# Patient Record
Sex: MALE | Race: WHITE | NOT HISPANIC OR LATINO | URBAN - NONMETROPOLITAN AREA
[De-identification: names, ages, dates, MRNs, and addresses within clinical notes are randomized per-mention and may not be internally consistent; named-entity substitution may affect disease eponyms.]

---

## 2017-01-06 ENCOUNTER — LAB (OUTPATIENT)
Dept: UROLOGY | Facility: CLINIC | Age: 42
End: 2017-01-06

## 2017-01-06 DIAGNOSIS — E29.1 HYPOGONADISM IN MALE: Primary | ICD-10-CM

## 2017-01-06 LAB
ALBUMIN SERPL-MCNC: 4.8 G/DL (ref 3.5–5)
ALP SERPL-CCNC: 83 U/L (ref 46–116)
ALT SERPL W P-5'-P-CCNC: 42 U/L (ref 10–44)
AST SERPL-CCNC: 27 U/L (ref 10–34)
BILIRUB CONJ SERPL-MCNC: 0.2 MG/DL (ref 0–0.2)
BILIRUB INDIRECT SERPL-MCNC: 0.6 MG/DL
BILIRUB SERPL-MCNC: 0.8 MG/DL (ref 0.2–1.8)
DEPRECATED RDW RBC AUTO: 39.4 FL (ref 37–54)
ERYTHROCYTE [DISTWIDTH] IN BLOOD BY AUTOMATED COUNT: 13.3 % (ref 11.5–14.5)
HCT VFR BLD AUTO: 53 % (ref 42–52)
HGB BLD-MCNC: 17.6 G/DL (ref 14–18)
MCH RBC QN AUTO: 26.9 PG (ref 27–33)
MCHC RBC AUTO-ENTMCNC: 33.2 G/DL (ref 33–37)
MCV RBC AUTO: 80.9 FL (ref 80–94)
PLATELET # BLD AUTO: 216 10*3/MM3 (ref 130–400)
PMV BLD AUTO: 11.8 FL (ref 6–10)
PROT SERPL-MCNC: 7.4 G/DL (ref 6–8)
RBC # BLD AUTO: 6.55 10*6/MM3 (ref 4.7–6.1)
TESTOST SERPL-MCNC: 222.88 NG/DL (ref 123.06–813.86)
WBC NRBC COR # BLD: 8.35 10*3/MM3 (ref 4.5–12.5)

## 2017-01-06 PROCEDURE — 80076 HEPATIC FUNCTION PANEL: CPT | Performed by: UROLOGY

## 2017-01-06 PROCEDURE — 84403 ASSAY OF TOTAL TESTOSTERONE: CPT | Performed by: UROLOGY

## 2017-01-06 PROCEDURE — 36415 COLL VENOUS BLD VENIPUNCTURE: CPT | Performed by: UROLOGY

## 2017-01-06 PROCEDURE — 85027 COMPLETE CBC AUTOMATED: CPT | Performed by: UROLOGY

## 2017-01-11 ENCOUNTER — OFFICE VISIT (OUTPATIENT)
Dept: UROLOGY | Facility: CLINIC | Age: 42
End: 2017-01-11

## 2017-01-11 DIAGNOSIS — E29.1 HYPOGONADISM IN MALE: Primary | ICD-10-CM

## 2017-01-11 PROCEDURE — 99213 OFFICE O/P EST LOW 20 MIN: CPT | Performed by: UROLOGY

## 2017-01-11 RX ORDER — PRAVASTATIN SODIUM 40 MG
TABLET ORAL
Refills: 5 | COMMUNITY
Start: 2016-12-28 | End: 2019-04-18 | Stop reason: SDUPTHER

## 2017-01-11 RX ORDER — AMLODIPINE BESYLATE 5 MG/1
TABLET ORAL
Refills: 5 | COMMUNITY
Start: 2016-12-28 | End: 2019-04-18

## 2017-01-11 RX ORDER — FLUTICASONE PROPIONATE 50 MCG
SPRAY, SUSPENSION (ML) NASAL
Refills: 5 | COMMUNITY
Start: 2016-12-28 | End: 2019-04-18 | Stop reason: SDUPTHER

## 2017-01-11 RX ORDER — MINOCYCLINE HYDROCHLORIDE 100 MG/1
CAPSULE ORAL
Refills: 2 | COMMUNITY
Start: 2016-12-09 | End: 2019-04-18

## 2017-01-11 RX ORDER — LISINOPRIL 40 MG/1
TABLET ORAL
Refills: 5 | COMMUNITY
Start: 2016-12-18 | End: 2019-04-18

## 2017-01-11 NOTE — MR AVS SNAPSHOT
[place]   2017 9:00 AM   Office Visit    Dept Phone:  [phone]   Encounter #:  [id number]    Provider:  Valdez Fisher MD   Department:  [facility] UROLOGY                Your Full Care Plan              Your Updated Medication List          This list is accurate as of: 17  9:36 AM.  Always use your most recent med list.                amLODIPine 5 MG tablet   Commonly known as:  NORVASC       fluticasone 50 MCG/ACT nasal spray   Commonly known as:  FLONASE       lisinopril 40 MG tablet   Commonly known as:  PRINIVIL,ZESTRIL       metoprolol tartrate 25 MG tablet   Commonly known as:  LOPRESSOR       minocycline 100 MG capsule   Commonly known as:  MINOCIN,DYNACIN       pravastatin 40 MG tablet   Commonly known as:  PRAVACHOL       Testosterone Cypionate 200 MG/ML injection   Commonly known as:  DEPO-TESTOSTERONE   Inject 1 mL into the shoulder, thigh, or buttocks 1 (one) time per week.               You Were Diagnosed With        Codes Comments    Hypogonadism in male    -  Primary ICD-10-CM: E29.1  ICD-9-CM: 257.2       Instructions     None    Patient Instructions History      Upcoming Appointments     Visit Type Date Time Department    FOLLOW UP 2017  9:00 AM [facility] UROLOGY [place]      [employer]ak Signup     [facility] Dayak allows you to send messages to your doctor, view your test results, renew your prescriptions, schedule appointments, and more. To sign up, go to [email or web address] and click on the Sign Up Now link in the New User? box. Enter your [employer] Activation Code exactly as it appears below along with the last four digits of your Social Security Number and your Date of Birth () to complete the sign-up process. If you do not sign up before the expiration date, you must request a new code.    Dayak Activation Code: [email or web address]  Expires: 2017 12:26 PM    If you have questions, you can email  [email or web address] or call [phone] to talk to our [employer] staff. Remember, [employer] is NOT to be used for urgent needs. For medical emergencies, dial 911.               Other Info from Your Visit           Allergies     No Known Allergies      Reason for Visit     Hypogonadism FU for Hypogonadism      Vital Signs     Smoking Status                   Former Smoker           Problems and Diagnoses Noted     Hypogonadism in male

## 2017-01-11 NOTE — PROGRESS NOTES
Chief Complaint:          Chief Complaint   Patient presents with   • Hypogonadism     FU for Hypogonadism       HPI:   41 y.o. male.  Pt is doing well on 1 cc testosterone per week.  His cbc and lft's are within normal limits.  HPI        Past Medical History:        Past Medical History   Diagnosis Date   • Fatigue    • Hypertension    • Hypogonadism in male          Current Meds:     Current Outpatient Prescriptions   Medication Sig Dispense Refill   • amLODIPine (NORVASC) 5 MG tablet TK 1 T PO QD  5   • fluticasone (FLONASE) 50 MCG/ACT nasal spray SHAKE LQ AND U 1 SPR IEN BID  5   • lisinopril (PRINIVIL,ZESTRIL) 40 MG tablet TAKE ONE TABLET BY MOUTH EVERY DAY  5   • metoprolol tartrate (LOPRESSOR) 25 MG tablet      • minocycline (MINOCIN,DYNACIN) 100 MG capsule TK ONE C PO D.  2   • pravastatin (PRAVACHOL) 40 MG tablet TK 1 T PO QD  5   • Testosterone Cypionate (DEPO-TESTOSTERONE) 200 MG/ML injection Inject 1 mL into the shoulder, thigh, or buttocks 1 (one) time per week. 4 mL 5     No current facility-administered medications for this visit.         Allergies:      No Known Allergies     Past Surgical History:     Past Surgical History   Procedure Laterality Date   • Tonsillectomy     • Mouth surgery           Social History:     Social History     Social History   • Marital status:      Spouse name: N/A   • Number of children: N/A   • Years of education: N/A     Occupational History   • Not on file.     Social History Main Topics   • Smoking status: Former Smoker   • Smokeless tobacco: Not on file   • Alcohol use No   • Drug use: Not on file   • Sexual activity: Not on file     Other Topics Concern   • Not on file     Social History Narrative   • No narrative on file       Family History:     Family History   Problem Relation Age of Onset   • Prostate cancer Other        Review of Systems:     Review of Systems    Physical Exam:     Physical Exam    Procedure:     No notes on file      Assessment:      Encounter Diagnosis   Name Primary?   • Hypogonadism in male Yes     No orders of the defined types were placed in this encounter.      Plan:   Will repeat cbc, lft's and total testosterone in 6 months.    Counseling was given to patient for the following topics diagnostic results. and the interim medical history and current results were reviewed.  A treatment plan with follow-up was made. Total time of the encounter was 21 minutes and 21 minutes were spent discussing Hypogonadism in male [E29.1] face-to-face.      This document has been electronically signed by Valdez Fisher MD January 11, 2017 9:26 AM

## 2017-07-02 DIAGNOSIS — R79.89 LOW TESTOSTERONE: ICD-10-CM

## 2017-07-03 RX ORDER — TESTOSTERONE CYPIONATE 200 MG/ML
INJECTION, SOLUTION INTRAMUSCULAR
Qty: 4 ML | Refills: 0 | Status: SHIPPED | OUTPATIENT
Start: 2017-07-03 | End: 2019-04-18

## 2017-07-03 NOTE — PROGRESS NOTES
Patient called stating he needs a refill on his testosterone cypionate.  Prescription for testosterone cypionate 200 mg to inject 1 mL per week #4 mL with no refills called to [facility] at [phone].

## 2017-07-25 ENCOUNTER — LAB (OUTPATIENT)
Dept: UROLOGY | Facility: CLINIC | Age: 42
End: 2017-07-25

## 2017-07-25 DIAGNOSIS — E29.1 HYPOGONADISM IN MALE: ICD-10-CM

## 2017-07-25 LAB
ALBUMIN SERPL-MCNC: 4.6 G/DL (ref 3.5–5)
ALP SERPL-CCNC: 74 U/L (ref 40–129)
ALT SERPL W P-5'-P-CCNC: 30 U/L (ref 10–44)
AST SERPL-CCNC: 27 U/L (ref 10–34)
BILIRUB CONJ SERPL-MCNC: 0.2 MG/DL (ref 0–0.2)
BILIRUB INDIRECT SERPL-MCNC: 0.7 MG/DL
BILIRUB SERPL-MCNC: 0.9 MG/DL (ref 0.2–1.8)
DEPRECATED RDW RBC AUTO: 43.5 FL (ref 37–54)
ERYTHROCYTE [DISTWIDTH] IN BLOOD BY AUTOMATED COUNT: 14.6 % (ref 11.5–14.5)
HCT VFR BLD AUTO: 53.6 % (ref 42–52)
HGB BLD-MCNC: 18.1 G/DL (ref 14–18)
MCH RBC QN AUTO: 27.3 PG (ref 27–33)
MCHC RBC AUTO-ENTMCNC: 33.8 G/DL (ref 33–37)
MCV RBC AUTO: 81 FL (ref 80–94)
PLATELET # BLD AUTO: 193 10*3/MM3 (ref 130–400)
PMV BLD AUTO: 11.6 FL (ref 6–10)
PROT SERPL-MCNC: 7.4 G/DL (ref 6–8)
RBC # BLD AUTO: 6.62 10*6/MM3 (ref 4.7–6.1)
TESTOST SERPL-MCNC: 742.04 NG/DL (ref 123.06–813.86)
WBC NRBC COR # BLD: 8.74 10*3/MM3 (ref 4.5–12.5)

## 2017-07-25 PROCEDURE — 85027 COMPLETE CBC AUTOMATED: CPT | Performed by: UROLOGY

## 2017-07-25 PROCEDURE — 84403 ASSAY OF TOTAL TESTOSTERONE: CPT | Performed by: UROLOGY

## 2017-07-25 PROCEDURE — 80076 HEPATIC FUNCTION PANEL: CPT | Performed by: UROLOGY

## 2017-08-03 ENCOUNTER — TRANSCRIBE ORDERS (OUTPATIENT)
Dept: INFUSION THERAPY | Facility: HOSPITAL | Age: 42
End: 2017-08-03

## 2017-08-03 ENCOUNTER — OFFICE VISIT (OUTPATIENT)
Dept: UROLOGY | Facility: CLINIC | Age: 42
End: 2017-08-03

## 2017-08-03 VITALS
WEIGHT: 235 LBS | BODY MASS INDEX: 36.88 KG/M2 | HEART RATE: 78 BPM | HEIGHT: 67 IN | SYSTOLIC BLOOD PRESSURE: 145 MMHG | DIASTOLIC BLOOD PRESSURE: 79 MMHG

## 2017-08-03 DIAGNOSIS — D75.1 POLYCYTHEMIA: Primary | ICD-10-CM

## 2017-08-03 DIAGNOSIS — E29.1 HYPOGONADISM, TESTICULAR: Primary | ICD-10-CM

## 2017-08-03 DIAGNOSIS — D75.1 POLYCYTHEMIA: ICD-10-CM

## 2017-08-03 PROCEDURE — 99213 OFFICE O/P EST LOW 20 MIN: CPT | Performed by: UROLOGY

## 2017-08-03 NOTE — PROGRESS NOTES
Chief Complaint:          Chief Complaint   Patient presents with   • Hypogonadism     6MTH FOLLOW UP       HPI:   42 y.o. male.    HPI  Pt is one cc of testosterone weekly.  Pt has normal lft's but his hgb is up to 18.      Past Medical History:        Past Medical History:   Diagnosis Date   • Fatigue    • Hypertension    • Hypogonadism in male          Current Meds:     Current Outpatient Prescriptions   Medication Sig Dispense Refill   • amLODIPine (NORVASC) 5 MG tablet TK 1 T PO QD  5   • fluticasone (FLONASE) 50 MCG/ACT nasal spray SHAKE LQ AND U 1 SPR IEN BID  5   • lisinopril (PRINIVIL,ZESTRIL) 40 MG tablet TAKE ONE TABLET BY MOUTH EVERY DAY  5   • metoprolol tartrate (LOPRESSOR) 25 MG tablet      • minocycline (MINOCIN,DYNACIN) 100 MG capsule TK ONE C PO D.  2   • pravastatin (PRAVACHOL) 40 MG tablet TK 1 T PO QD  5   • Testosterone Cypionate (DEPOTESTOTERONE CYPIONATE) 200 MG/ML injection INJECT 1 ML IN THE MUSCLE ONCE WEEKLY 4 mL 0     No current facility-administered medications for this visit.         Allergies:      No Known Allergies     Past Surgical History:     Past Surgical History:   Procedure Laterality Date   • MOUTH SURGERY     • TONSILLECTOMY           Social History:     Social History     Social History   • Marital status:      Spouse name: N/A   • Number of children: N/A   • Years of education: N/A     Occupational History   • Not on file.     Social History Main Topics   • Smoking status: Former Smoker   • Smokeless tobacco: Not on file   • Alcohol use No   • Drug use: Not on file   • Sexual activity: Not on file     Other Topics Concern   • Not on file     Social History Narrative       Family History:     Family History   Problem Relation Age of Onset   • Prostate cancer Other        Review of Systems:     Review of Systems    Physical Exam:     Physical Exam    Procedure:     No notes on file      Assessment:     Encounter Diagnoses   Name Primary?   • Polycythemia    •  Hypogonadism, testicular Yes     Orders Placed This Encounter   Procedures   • CBC (No Diff)     Standing Status:   Future     Standing Expiration Date:   8/3/2018       Plan:   Will have pt have one unit off to lower hgb.  Will repeat cbc and see pt back in 3 months.    Counseling was given to patient for the following topics diagnostic results. and the interim medical history and current results were reviewed.  A treatment plan with follow-up was made. Total time of the encounter was 23 minutes and 23 minutes were spent discussing Hypogonadism, testicular [E29.1] face-to-face.        This document has been electronically signed by Valdez Fisher MD August 3, 2017 2:04 PM

## 2017-08-08 ENCOUNTER — HOSPITAL ENCOUNTER (OUTPATIENT)
Dept: INFUSION THERAPY | Facility: HOSPITAL | Age: 42
Discharge: HOME | End: 2017-08-08
Attending: UROLOGY | Admitting: UROLOGY

## 2017-08-08 VITALS
HEART RATE: 73 BPM | TEMPERATURE: 98 F | RESPIRATION RATE: 17 BRPM | DIASTOLIC BLOOD PRESSURE: 74 MMHG | SYSTOLIC BLOOD PRESSURE: 121 MMHG

## 2017-08-08 DIAGNOSIS — D75.1 POLYCYTHEMIA: Primary | ICD-10-CM

## 2017-08-08 LAB
HCT VFR BLD AUTO: 53.5 % (ref 42–52)
HGB BLD-MCNC: 17.9 G/DL (ref 14–18)
POST-BLOOD PRESSURE: NORMAL
PRE-BLOOD PRESSURE: NORMAL
PRE-HCT: NORMAL
PRE-HGB: NORMAL
PULSE: 74
VOLUME COLLECTED: NORMAL

## 2017-08-08 PROCEDURE — 85014 HEMATOCRIT: CPT | Performed by: UROLOGY

## 2017-08-08 PROCEDURE — 99195 PHLEBOTOMY: CPT

## 2017-08-08 PROCEDURE — 85018 HEMOGLOBIN: CPT | Performed by: UROLOGY

## 2017-08-08 PROCEDURE — 99195 PHLEBOTOMY: CPT | Performed by: UROLOGY

## 2017-08-08 NOTE — PATIENT INSTRUCTIONS
Therapeutic Phlebotomy  Therapeutic phlebotomy is the controlled removal of blood from a person's body for the purpose of treating a medical condition. The procedure is similar to donating blood. Usually, about a pint (470 mL, or 0.47L) of blood is removed. The average adult has 9-12 pints (4.3-5.7 L) of blood.  Therapeutic phlebotomy may be used to treat the following medical conditions:  · Hemochromatosis. This is a condition in which the blood contains too much iron.  · Polycythemia vera. This is a condition in which the blood contains too many red blood cells.  · Porphyria cutanea tarda. This is a disease in which an important part of hemoglobin is not made properly. It results in the buildup of abnormal amounts of porphyrins in the body.  · Sickle cell disease. This is a condition in which the red blood cells form an abnormal crescent shape rather than a round shape.  LET YOUR HEALTH CARE PROVIDER KNOW ABOUT:  · Any allergies you have.  · All medicines you are taking, including vitamins, herbs, eye drops, creams, and over-the-counter medicines.  · Previous problems you or members of your family have had with the use of anesthetics.  · Any blood disorders you have.  · Previous surgeries you have had.  · Any medical conditions you may have.  RISKS AND COMPLICATIONS  Generally, this is a safe procedure. However, problems may occur, including:  · Nausea or light-headedness.  · Low blood pressure.  · Soreness, bleeding, swelling, or bruising at the needle insertion site.  · Infection.  BEFORE THE PROCEDURE  · Follow instructions from your health care provider about eating or drinking restrictions.  · Ask your health care provider about changing or stopping your regular medicines. This is especially important if you are taking diabetes medicines or blood thinners.  · Wear clothing with sleeves that can be raised above the elbow.  · Plan to have someone take you home after the procedure.  · You may have a blood sample  taken.  PROCEDURE  · A needle will be inserted into one of your veins.  · Tubing and a collection bag will be attached to that needle.  · Blood will flow through the needle and tubing into the collection bag.  · You may be asked to open and close your hand slowly and continually during the entire collection.  · After the specified amount of blood has been removed from your body, the collection bag and tubing will be clamped.  · The needle will be removed from your vein.  · Pressure will be held on the site of the needle insertion to stop the bleeding.  · A bandage (dressing) will be placed over the needle insertion site.  The procedure may vary among health care providers and hospitals.  AFTER THE PROCEDURE  · Your recovery will be assessed and monitored.  · You can return to your normal activities as directed by your health care provider.     This information is not intended to replace advice given to you by your health care provider. Make sure you discuss any questions you have with your health care provider.     Document Released: 05/21/2012 Document Revised: 05/03/2016 Document Reviewed: 12/14/2015  [employer] Interactive Patient Education ©2017 Elsevier Inc.  Therapeutic Phlebotomy, Care After  Refer to this sheet in the next few weeks. These instructions provide you with information about caring for yourself after your procedure. Your health care provider may also give you more specific instructions. Your treatment has been planned according to current medical practices, but problems sometimes occur. Call your health care provider if you have any problems or questions after your procedure.  WHAT TO EXPECT AFTER THE PROCEDURE  After your procedure, it is common to have:  · Light-headedness or dizziness. You may feel faint.  · Nausea.  · Tiredness.  HOME CARE INSTRUCTIONS  Activities  · Return to your normal activities as directed by your health care provider. Most people can go back to their normal activities  right away.  · Avoid strenuous physical activity and heavy lifting or pulling for about 5 hours after the procedure. Do not lift anything that is heavier than 10 lb (4.5 kg).  · Athletes should avoid strenuous exercise for at least 12 hours.  · Change positions slowly for the remainder of the day. This will help to prevent light-headedness or fainting.  · If you feel light-headed, lie down until the feeling goes away.  Eating and Drinking  · Be sure to eat well-balanced meals for the next 24 hours.  · Drink enough fluid to keep your urine clear or pale yellow.  · Avoid drinking alcohol on the day that you had the procedure.  Care of the Needle Insertion Site  · Keep your bandage dry. You can remove the bandage after about 5 hours or as directed by your health care provider.  · If you have bleeding from the needle insertion site, elevate your arm and press firmly on the site until the bleeding stops.  · If you have bruising at the site, apply ice to the area:    Put ice in a plastic bag.    Place a towel between your skin and the bag.    Leave the ice on for 20 minutes, 2-3 times a day for the first 24 hours.  · If the swelling does not go away after 24 hours, apply a warm, moist washcloth to the area for 20 minutes, 2-3 times a day.  General Instructions  · Avoid smoking for at least 30 minutes after the procedure.  · Keep all follow-up visits as directed by your health care provider. It is important to continue with further therapeutic phlebotomy treatments as directed.  SEEK MEDICAL CARE IF:  · You have redness, swelling, or pain at the needle insertion site.  · You have fluid, blood, or pus coming from the needle insertion site.  · You feel light-headed, dizzy, or nauseated, and the feeling does not go away.  · You notice new bruising at the needle insertion site.  · You feel weaker than normal.  · You have a fever or chills.  SEEK IMMEDIATE MEDICAL CARE IF:  · You have severe nausea or vomiting.  · You have  chest pain.  · You have trouble breathing.     This information is not intended to replace advice given to you by your health care provider. Make sure you discuss any questions you have with your health care provider.     Document Released: 05/21/2012 Document Revised: 05/03/2016 Document Reviewed: 12/14/2015  [employer] Interactive Patient Education ©2017 Elsevier Inc.    Polycythemia Vera  Polycythemia vera (PV), or myeloproliferative disease, is a form of blood cancer in which the bone marrow makes too many (overproduces) red blood cells. The bone marrow may also make too many clotting cells (platelets) and white blood cells. Bone marrow is the spongy center of bones where blood cells are produced. Sometimes, there may be an overproduction of blood cells in the liver and spleen, causing those organs to become enlarged. Additionally, people who have PV are at a higher risk for stroke or heart attack because their blood may clot more easily. PV is a long-term disease.  CAUSES  Almost all people who have PV have an abnormal gene (genetic mutation) that causes changes in the way that the bone marrow makes blood cells. This gene, which is called JAK2, is not passed along from parent to child (is not hereditary). It is not known what triggers the genetic mutation that causes the body to produce too many red blood cells.  RISK FACTORS  This condition is more likely to develop in:  · Males.  · People who are 60 years of age or older.  SYMPTOMS  You may not have any symptoms in the early stage of PV. When symptoms develop, they may include:  · Shortness of breath.  · Dizziness.  · Hot and flushed skin.  · Itchy skin.  · Sweats, especially night sweats.  · Headache.  · Tiredness.  · Ringing in the ears.  · Blurred vision or blind spots.  · Bone pain.  · Weight loss.  · Fever.  · Blood-tinged vomit or bowel movements.  DIAGNOSIS  This condition may be diagnosed during a routine physical exam if you have a blood test called a  complete blood count (CBC). Your health care provider also may suspect PV if you have symptoms. During the physical exam, your provider may find that you have an enlarged liver or spleen. You may also have tests to confirm the diagnosis. These may include:  · A procedure to remove a sample of bone marrow for testing (bone marrow biopsy).  · Blood tests to check for:    The JAK2 gene.    Low levels of a hormone that helps to regulate blood production (erythropoietin).  TREATMENT  There is no cure for PV, but treatment can help to control the disease. There are several types of treatment. No single treatment works for everyone. You will need to work with a blood cancer specialist (hematologist) to find the treatment that is best for you. Options include:  · Periodically having some blood removed with a needle (drawn) to lower the number of red blood cells (phlebotomy).  · Medicine. Your health care provider may recommend:    Low-dose aspirin to lower your risk for blood clots.    A medicine to reduce red blood cell production (hydroxyurea).    A medicine to lower the number of red blood cells (interferon).    A medicine that slows down the effects of JAK2 (ruxolitinib).  HOME CARE INSTRUCTIONS  · Take over-the-counter and prescription medicines only as told by your health care provider.  · Return to your normal activities as told by your health care provider. Ask your health care provider what activities are safe for you.  · Do not use tobacco products, including cigarettes, chewing tobacco, or e-cigarettes. If you need help quitting, ask your health care provider.  · Keep all follow-up visits as told by your health care provider. This is important.  SEEK MEDICAL CARE IF:  · You have side effects from your medicines.  · Your symptoms change or get worse at home.  · You have blood in your stool or you vomit blood.  SEEK IMMEDIATE MEDICAL CARE IF:  · You have sudden and severe pain in your abdomen.  · You have chest  pain or difficulty breathing.  · You have signs of stroke, such as:    Sudden numbness.    Weakness of your face or arm.    Confusion.    Difficulty speaking or understanding speech.  These symptoms may represent a serious problem that is an emergency. Do not wait to see if the symptoms will go away. Get medical help right away. Call your local emergency services (911 in the [place] Do not drive yourself to the hospital.     This information is not intended to replace advice given to you by your health care provider. Make sure you discuss any questions you have with your health care provider.     Document Released: 09/12/2002 Document Revised: 04/10/2017 Document Reviewed: 06/29/2016  Elsevier Interactive Patient Education ©2017 Elsevier Inc.

## 2017-10-30 ENCOUNTER — LAB (OUTPATIENT)
Dept: UROLOGY | Facility: CLINIC | Age: 42
End: 2017-10-30

## 2017-10-30 DIAGNOSIS — E29.1 HYPOGONADISM, TESTICULAR: ICD-10-CM

## 2017-10-30 DIAGNOSIS — D75.1 POLYCYTHEMIA: ICD-10-CM

## 2017-10-30 LAB
DEPRECATED RDW RBC AUTO: 40.9 FL (ref 37–54)
ERYTHROCYTE [DISTWIDTH] IN BLOOD BY AUTOMATED COUNT: 13.4 % (ref 11.5–14.5)
HCT VFR BLD AUTO: 54.8 % (ref 42–52)
HGB BLD-MCNC: 17.9 G/DL (ref 14–18)
MCH RBC QN AUTO: 27.1 PG (ref 27–33)
MCHC RBC AUTO-ENTMCNC: 32.7 G/DL (ref 33–37)
MCV RBC AUTO: 82.9 FL (ref 80–94)
PLATELET # BLD AUTO: 209 10*3/MM3 (ref 130–400)
PMV BLD AUTO: 11.4 FL (ref 6–10)
RBC # BLD AUTO: 6.61 10*6/MM3 (ref 4.7–6.1)
WBC NRBC COR # BLD: 8.35 10*3/MM3 (ref 4.5–12.5)

## 2017-10-30 PROCEDURE — 85027 COMPLETE CBC AUTOMATED: CPT | Performed by: UROLOGY

## 2017-10-30 PROCEDURE — 36415 COLL VENOUS BLD VENIPUNCTURE: CPT | Performed by: UROLOGY

## 2017-11-03 ENCOUNTER — OFFICE VISIT (OUTPATIENT)
Dept: UROLOGY | Facility: CLINIC | Age: 42
End: 2017-11-03

## 2017-11-03 DIAGNOSIS — D75.1 POLYCYTHEMIA: Primary | ICD-10-CM

## 2017-11-03 DIAGNOSIS — E29.1 HYPOGONADISM, TESTICULAR: ICD-10-CM

## 2017-11-03 PROCEDURE — 99213 OFFICE O/P EST LOW 20 MIN: CPT | Performed by: UROLOGY

## 2017-11-03 NOTE — PROGRESS NOTES
Chief Complaint:          Chief Complaint   Patient presents with   • Hypogonadism       HPI:   42 y.o. male.    HPI  Pt is on 1 cc of testosterone weekly His hgb is almost 18 it is 17.9      Past Medical History:        Past Medical History:   Diagnosis Date   • Fatigue    • Hyperlipidemia    • Hypertension    • Hypogonadism in male          Current Meds:     Current Outpatient Prescriptions   Medication Sig Dispense Refill   • amLODIPine (NORVASC) 5 MG tablet TK 1 T PO QD  5   • fluticasone (FLONASE) 50 MCG/ACT nasal spray SHAKE LQ AND U 1 SPR IEN BID  5   • lisinopril (PRINIVIL,ZESTRIL) 40 MG tablet TAKE ONE TABLET BY MOUTH EVERY DAY  5   • metoprolol tartrate (LOPRESSOR) 25 MG tablet Take 25 mg by mouth Daily.     • minocycline (MINOCIN,DYNACIN) 100 MG capsule TK ONE C PO D.  2   • pravastatin (PRAVACHOL) 40 MG tablet TK 1 T PO QD  5   • Testosterone Cypionate (DEPOTESTOTERONE CYPIONATE) 200 MG/ML injection INJECT 1 ML IN THE MUSCLE ONCE WEEKLY 4 mL 0     No current facility-administered medications for this visit.         Allergies:      Allergies   Allergen Reactions   • Fish-Derived Products      Scallops and mackeral   • Other      turnips        Past Surgical History:     Past Surgical History:   Procedure Laterality Date   • MOUTH SURGERY     • TONSILLECTOMY           Social History:     Social History     Social History   • Marital status:      Spouse name: N/A   • Number of children: N/A   • Years of education: N/A     Occupational History   • Not on file.     Social History Main Topics   • Smoking status: Former Smoker     Quit date: 4/29/2013   • Smokeless tobacco: Not on file   • Alcohol use No   • Drug use: No   • Sexual activity: Defer     Other Topics Concern   • Not on file     Social History Narrative       Family History:     Family History   Problem Relation Age of Onset   • Prostate cancer Other    • Diabetes Mother    • Thyroid disease Father    • Hyperlipidemia Father    • Diabetes  Maternal Grandmother    • Anemia Paternal Grandmother        Review of Systems:     Review of Systems    Physical Exam:     Physical Exam    Procedure:     No notes on file      Assessment:     Encounter Diagnoses   Name Primary?   • Polycythemia Yes   • Hypogonadism, testicular      No orders of the defined types were placed in this encounter.      Plan:   Will have another unit of blood removed.  Will repeat cbc and testosterone and lft's in 3 month    Counseling was given to patient for the following topics instructions for management. and the interim medical history and current results were reviewed.  A treatment plan with follow-up was made. Total time of the encounter was 23 minutes and 23 minutes were spent discussing Polycythemia [D75.1] face-to-face.        This document has been electronically signed by Valdez Fisher MD November 3, 2017 1:35 PM

## 2017-11-15 ENCOUNTER — DOCUMENTATION (OUTPATIENT)
Dept: UROLOGY | Facility: CLINIC | Age: 42
End: 2017-11-15

## 2017-11-15 ENCOUNTER — TRANSCRIBE ORDERS (OUTPATIENT)
Dept: INFUSION THERAPY | Facility: HOSPITAL | Age: 42
End: 2017-11-15

## 2017-11-15 DIAGNOSIS — D75.1 POLYCYTHEMIA: Primary | ICD-10-CM

## 2017-11-15 NOTE — PROGRESS NOTES
Patient scheduled with [facility] for Therapeutic Phlebotomy on 11/21/17 at 9:00am, Patient aware of appointment date and time and voices understanding at this time.

## 2017-11-17 ENCOUNTER — APPOINTMENT (OUTPATIENT)
Dept: CT IMAGING | Facility: HOSPITAL | Age: 42
End: 2017-11-17

## 2017-11-17 ENCOUNTER — APPOINTMENT (OUTPATIENT)
Dept: GENERAL RADIOLOGY | Facility: HOSPITAL | Age: 42
End: 2017-11-17

## 2017-11-17 ENCOUNTER — APPOINTMENT (OUTPATIENT)
Dept: NUCLEAR MEDICINE | Facility: HOSPITAL | Age: 42
End: 2017-11-17

## 2017-11-17 ENCOUNTER — HOSPITAL ENCOUNTER (EMERGENCY)
Facility: HOSPITAL | Age: 42
Discharge: HOME | End: 2017-11-17
Attending: FAMILY MEDICINE | Admitting: FAMILY MEDICINE

## 2017-11-17 VITALS
RESPIRATION RATE: 18 BRPM | DIASTOLIC BLOOD PRESSURE: 78 MMHG | WEIGHT: 215 LBS | SYSTOLIC BLOOD PRESSURE: 120 MMHG | OXYGEN SATURATION: 97 % | HEIGHT: 67 IN | BODY MASS INDEX: 33.74 KG/M2 | TEMPERATURE: 98.2 F | HEART RATE: 95 BPM

## 2017-11-17 DIAGNOSIS — R55 SYNCOPE, UNSPECIFIED SYNCOPE TYPE: Primary | ICD-10-CM

## 2017-11-17 DIAGNOSIS — R03.0 ELEVATED BLOOD PRESSURE READING: ICD-10-CM

## 2017-11-17 DIAGNOSIS — R56.9 SEIZURE (HCC): ICD-10-CM

## 2017-11-17 LAB
ALBUMIN SERPL-MCNC: 5.3 G/DL (ref 3.5–5)
ALBUMIN/GLOB SERPL: 2 G/DL (ref 1.5–2.5)
ALP SERPL-CCNC: 68 U/L (ref 40–129)
ALT SERPL W P-5'-P-CCNC: 41 U/L (ref 10–44)
ANION GAP SERPL CALCULATED.3IONS-SCNC: 2.5 MMOL/L (ref 3.6–11.2)
AST SERPL-CCNC: 38 U/L (ref 10–34)
BASOPHILS # BLD AUTO: 0.03 10*3/MM3 (ref 0–0.3)
BASOPHILS NFR BLD AUTO: 0.2 % (ref 0–2)
BILIRUB SERPL-MCNC: 0.8 MG/DL (ref 0.2–1.8)
BNP SERPL-MCNC: 6 PG/ML (ref 0–100)
BUN BLD-MCNC: 18 MG/DL (ref 7–21)
BUN/CREAT SERPL: 14.3 (ref 7–25)
CALCIUM SPEC-SCNC: 9.9 MG/DL (ref 7.7–10)
CHLORIDE SERPL-SCNC: 106 MMOL/L (ref 99–112)
CO2 SERPL-SCNC: 30.5 MMOL/L (ref 24.3–31.9)
CREAT BLD-MCNC: 1.26 MG/DL (ref 0.43–1.29)
DEPRECATED RDW RBC AUTO: 41.8 FL (ref 37–54)
EOSINOPHIL # BLD AUTO: 0.09 10*3/MM3 (ref 0–0.7)
EOSINOPHIL NFR BLD AUTO: 0.6 % (ref 0–5)
ERYTHROCYTE [DISTWIDTH] IN BLOOD BY AUTOMATED COUNT: 14.2 % (ref 11.5–14.5)
GFR SERPL CREATININE-BSD FRML MDRD: 63 ML/MIN/1.73
GLOBULIN UR ELPH-MCNC: 2.6 GM/DL
GLUCOSE BLD-MCNC: 91 MG/DL (ref 70–110)
HCT VFR BLD AUTO: 55.1 % (ref 42–52)
HGB BLD-MCNC: 18.7 G/DL (ref 14–18)
HOLD SPECIMEN: NORMAL
HOLD SPECIMEN: NORMAL
IMM GRANULOCYTES # BLD: 0.03 10*3/MM3 (ref 0–0.03)
IMM GRANULOCYTES NFR BLD: 0.2 % (ref 0–0.5)
INR PPP: 0.99 (ref 0.9–1.1)
LYMPHOCYTES # BLD AUTO: 1.55 10*3/MM3 (ref 1–3)
LYMPHOCYTES NFR BLD AUTO: 10.6 % (ref 21–51)
MCH RBC QN AUTO: 27.9 PG (ref 27–33)
MCHC RBC AUTO-ENTMCNC: 33.9 G/DL (ref 33–37)
MCV RBC AUTO: 82.1 FL (ref 80–94)
MONOCYTES # BLD AUTO: 0.99 10*3/MM3 (ref 0.1–0.9)
MONOCYTES NFR BLD AUTO: 6.7 % (ref 0–10)
NEUTROPHILS # BLD AUTO: 11.99 10*3/MM3 (ref 1.4–6.5)
NEUTROPHILS NFR BLD AUTO: 81.7 % (ref 30–70)
OSMOLALITY SERPL CALC.SUM OF ELEC: 279 MOSM/KG (ref 273–305)
PLATELET # BLD AUTO: 224 10*3/MM3 (ref 130–400)
PMV BLD AUTO: 10.9 FL (ref 6–10)
POTASSIUM BLD-SCNC: 5.2 MMOL/L (ref 3.5–5.3)
PROT SERPL-MCNC: 7.9 G/DL (ref 6–8)
PROTHROMBIN TIME: 13.2 SECONDS (ref 11–15.4)
RBC # BLD AUTO: 6.71 10*6/MM3 (ref 4.7–6.1)
SODIUM BLD-SCNC: 139 MMOL/L (ref 135–153)
TROPONIN I SERPL-MCNC: <0.006 NG/ML
TROPONIN I SERPL-MCNC: <0.006 NG/ML
WBC NRBC COR # BLD: 14.68 10*3/MM3 (ref 4.5–12.5)
WHOLE BLOOD HOLD SPECIMEN: NORMAL
WHOLE BLOOD HOLD SPECIMEN: NORMAL

## 2017-11-17 PROCEDURE — 84484 ASSAY OF TROPONIN QUANT: CPT | Performed by: FAMILY MEDICINE

## 2017-11-17 PROCEDURE — 70491 CT SOFT TISSUE NECK W/DYE: CPT

## 2017-11-17 PROCEDURE — 70470 CT HEAD/BRAIN W/O & W/DYE: CPT

## 2017-11-17 PROCEDURE — 85610 PROTHROMBIN TIME: CPT | Performed by: FAMILY MEDICINE

## 2017-11-17 PROCEDURE — 71010 XR CHEST 1 VW: CPT | Performed by: RADIOLOGY

## 2017-11-17 PROCEDURE — 84146 ASSAY OF PROLACTIN: CPT | Performed by: FAMILY MEDICINE

## 2017-11-17 PROCEDURE — 83880 ASSAY OF NATRIURETIC PEPTIDE: CPT | Performed by: FAMILY MEDICINE

## 2017-11-17 PROCEDURE — 93005 ELECTROCARDIOGRAM TRACING: CPT | Performed by: FAMILY MEDICINE

## 2017-11-17 PROCEDURE — 71275 CT ANGIOGRAPHY CHEST: CPT

## 2017-11-17 PROCEDURE — 71275 CT ANGIOGRAPHY CHEST: CPT | Performed by: RADIOLOGY

## 2017-11-17 PROCEDURE — 70460 CT HEAD/BRAIN W/DYE: CPT | Performed by: RADIOLOGY

## 2017-11-17 PROCEDURE — 71010 HC CHEST PA OR AP: CPT

## 2017-11-17 PROCEDURE — 80053 COMPREHEN METABOLIC PANEL: CPT | Performed by: FAMILY MEDICINE

## 2017-11-17 PROCEDURE — 0 IOPAMIDOL 61 % SOLUTION: Performed by: FAMILY MEDICINE

## 2017-11-17 PROCEDURE — 70491 CT SOFT TISSUE NECK W/DYE: CPT | Performed by: RADIOLOGY

## 2017-11-17 PROCEDURE — 85025 COMPLETE CBC W/AUTO DIFF WBC: CPT | Performed by: FAMILY MEDICINE

## 2017-11-17 PROCEDURE — 70450 CT HEAD/BRAIN W/O DYE: CPT

## 2017-11-17 PROCEDURE — 70460 CT HEAD/BRAIN W/DYE: CPT

## 2017-11-17 PROCEDURE — 99284 EMERGENCY DEPT VISIT MOD MDM: CPT

## 2017-11-17 PROCEDURE — 93010 ELECTROCARDIOGRAM REPORT: CPT | Performed by: INTERNAL MEDICINE

## 2017-11-17 PROCEDURE — 36415 COLL VENOUS BLD VENIPUNCTURE: CPT

## 2017-11-17 PROCEDURE — 70450 CT HEAD/BRAIN W/O DYE: CPT | Performed by: RADIOLOGY

## 2017-11-17 RX ORDER — ACETAMINOPHEN 325 MG/1
650 TABLET ORAL ONCE
Status: COMPLETED | OUTPATIENT
Start: 2017-11-17 | End: 2017-11-17

## 2017-11-17 RX ORDER — SODIUM CHLORIDE 0.9 % (FLUSH) 0.9 %
10 SYRINGE (ML) INJECTION AS NEEDED
Status: DISCONTINUED | OUTPATIENT
Start: 2017-11-17 | End: 2017-11-17 | Stop reason: HOSPADM

## 2017-11-17 RX ADMIN — IOPAMIDOL 100 ML: 612 INJECTION, SOLUTION INTRAVENOUS at 19:26

## 2017-11-17 RX ADMIN — ACETAMINOPHEN 650 MG: 325 TABLET ORAL at 18:33

## 2017-11-17 NOTE — ED PROVIDER NOTES
"Subjective   HPI Comments: 42-year-old male with history of hypertension, hyperlipidemia, polycythemia presents the emergency room with syncopal episode that occurred prior to arrival.  Patient states he was at work at the local [employer].  He states he was standing and had just completed a transaction with a customer when he subsequently went to go to the lunchroom.  He states that he started to feel dizzy and lightheaded and subsequently passed out.  Patient states that he woke up approximately couple minutes later.  He states that bystanders/coworkers told him that he was driving open eyes rolling in the back of his head during episode.  He denies nausea, vomiting, chest pain or shortness of breath.  He denies palpitations.  He denies to have any symptoms prior to passing out.  Patient states he does have a mild headache and feels \"drained\".    Patient is a 42 y.o. male presenting with syncope.   Syncope   Episode history:  Single  Most recent episode:  Today  Progression:  Resolved  Chronicity:  New  Context: standing up    Witnessed: yes    Relieved by:  Nothing  Worsened by:  Nothing  Associated symptoms: dizziness and seizures    Associated symptoms: no chest pain, no fever, no focal weakness, no malaise/fatigue, no nausea, no palpitations, no shortness of breath, no visual change, no vomiting and no weakness        Review of Systems   Constitutional: Negative for activity change, fever and malaise/fatigue.   HENT: Negative for congestion, sore throat and tinnitus.    Eyes: Negative for visual disturbance.   Respiratory: Negative for apnea, cough, shortness of breath, wheezing and stridor.    Cardiovascular: Positive for syncope. Negative for chest pain and palpitations.   Gastrointestinal: Negative for abdominal pain, diarrhea, nausea and vomiting.   Genitourinary: Negative for dysuria and flank pain.   Musculoskeletal: Negative for arthralgias and myalgias.   Skin: Negative for rash.   Neurological: " Positive for dizziness and seizures. Negative for focal weakness, weakness and light-headedness.   Hematological: Negative for adenopathy.   Psychiatric/Behavioral: Negative for agitation.   All other systems reviewed and are negative.      Past Medical History:   Diagnosis Date   • Fatigue    • Hyperlipidemia    • Hypertension    • Hypogonadism in male        Allergies   Allergen Reactions   • Fish-Derived Products      Scallops and mackeral   • Other      turnips       Past Surgical History:   Procedure Laterality Date   • MOUTH SURGERY     • TONSILLECTOMY         Family History   Problem Relation Age of Onset   • Prostate cancer Other    • Diabetes Mother    • Thyroid disease Father    • Hyperlipidemia Father    • Diabetes Maternal Grandmother    • Anemia Paternal Grandmother        Social History     Social History   • Marital status:      Spouse name: N/A   • Number of children: N/A   • Years of education: N/A     Social History Main Topics   • Smoking status: Former Smoker     Quit date: 4/29/2013   • Smokeless tobacco: None   • Alcohol use No   • Drug use: No   • Sexual activity: Defer     Other Topics Concern   • None     Social History Narrative           Objective   Physical Exam   Constitutional: He is oriented to person, place, and time. He appears well-developed and well-nourished. No distress.   HENT:   Head: Normocephalic and atraumatic.   Mouth/Throat: Oropharynx is clear and moist.   Eyes: EOM are normal. Pupils are equal, round, and reactive to light.   Anicteric.  No nystagmus.  No gaze palsy   Neck: Normal range of motion. Neck supple.   No JVD.  No carotid bruit.  No lymphadenopathy.  No thyromegaly.   Cardiovascular: Normal rate, regular rhythm and normal heart sounds.   No extrasystoles are present.   Pulses:       Radial pulses are 2+ on the right side, and 2+ on the left side.        Dorsalis pedis pulses are 2+ on the right side, and 2+ on the left side.   Pulmonary/Chest: Effort  normal and breath sounds normal. No respiratory distress. He has no wheezes. He has no rales. He exhibits no tenderness.   No accessory muscle use.   Abdominal: Soft. Bowel sounds are normal. There is no rebound and no guarding.   No abdominal bruit.  No pulsatile mass.  Mild epigastric tenderness to palpation.   Neurological: He is alert and oriented to person, place, and time. No cranial nerve deficit or sensory deficit. GCS eye subscore is 4. GCS verbal subscore is 5. GCS motor subscore is 6.   No pronator drift.  No lower limb drift.  Normal finger-nose test.  Normal heel-to-shin test.   Skin: Skin is warm and dry. He is not diaphoretic.   Nursing note and vitals reviewed.      Procedures         ED Course  ED Course                Patient neurologically intact.  Patient and extra scale is 0.  Patient with no seizure activity throughout emergency room stay.  Patient first set of cardiac enzymes unremarkable.  Patient's CT scan of head is unremarkable.  Patient with elevated white count likely secondary to possibly recent seizure-like activity.  I discussed findings with patient.  Will obtain further imaging in light of patient's recent syncopal episode we'll continue to monitor patient.    Patient continues to remain hemodynamically stable.  Patient with essentially unremarkable except for elevated heart rate with changes in position.  Patient's CT chest shows no pulmonary embolism.  Patient's CT scan of head as well as neck shows no acute abnormality.  Patient did report symptoms of passing out subscore had seizure-like activity with drawing up as well as eyes rolling.  In light of the symptoms patient likely with new onset seizure.  Have discussed the follow-up with neurology.  Have discussed no driving and operating heavy machinery until follow-up with neurology.  Have discussed to contact neurology on Monday.  Patient discussed warning symptoms or return emergency room.  Patient with 2 sets of cardiac  enzymes returned negative patient verbalizes understanding.  [facility]    Final diagnoses:   Syncope, unspecified syncope type   Seizure   Elevated blood pressure reading            Gerson Wilson MD  11/17/17 [id number]

## 2017-11-19 LAB — PROLACTIN SERPL-MCNC: 12.2 NG/ML (ref 4–15.2)

## 2017-11-21 ENCOUNTER — HOSPITAL ENCOUNTER (OUTPATIENT)
Dept: INFUSION THERAPY | Facility: HOSPITAL | Age: 42
Discharge: HOME | End: 2017-11-21
Attending: UROLOGY | Admitting: UROLOGY

## 2017-11-21 VITALS
DIASTOLIC BLOOD PRESSURE: 66 MMHG | RESPIRATION RATE: 17 BRPM | WEIGHT: 218 LBS | BODY MASS INDEX: 34.21 KG/M2 | SYSTOLIC BLOOD PRESSURE: 109 MMHG | TEMPERATURE: 98.2 F | HEART RATE: 84 BPM | HEIGHT: 67 IN

## 2017-11-21 DIAGNOSIS — D75.1 POLYCYTHEMIA: Primary | ICD-10-CM

## 2017-11-21 LAB
BASOPHILS # BLD AUTO: 0.04 10*3/MM3 (ref 0–0.3)
BASOPHILS NFR BLD AUTO: 0.5 % (ref 0–2)
DEPRECATED RDW RBC AUTO: 40.9 FL (ref 37–54)
EOSINOPHIL # BLD AUTO: 0.17 10*3/MM3 (ref 0–0.7)
EOSINOPHIL NFR BLD AUTO: 2.1 % (ref 0–5)
ERYTHROCYTE [DISTWIDTH] IN BLOOD BY AUTOMATED COUNT: 13.4 % (ref 11.5–14.5)
HCT VFR BLD AUTO: 54.3 % (ref 42–52)
HGB BLD-MCNC: 18.2 G/DL (ref 14–18)
IMM GRANULOCYTES # BLD: 0.02 10*3/MM3 (ref 0–0.03)
IMM GRANULOCYTES NFR BLD: 0.2 % (ref 0–0.5)
LYMPHOCYTES # BLD AUTO: 1.82 10*3/MM3 (ref 1–3)
LYMPHOCYTES NFR BLD AUTO: 22.3 % (ref 21–51)
MCH RBC QN AUTO: 27.6 PG (ref 27–33)
MCHC RBC AUTO-ENTMCNC: 33.5 G/DL (ref 33–37)
MCV RBC AUTO: 82.4 FL (ref 80–94)
MONOCYTES # BLD AUTO: 0.75 10*3/MM3 (ref 0.1–0.9)
MONOCYTES NFR BLD AUTO: 9.2 % (ref 0–10)
NEUTROPHILS # BLD AUTO: 5.37 10*3/MM3 (ref 1.4–6.5)
NEUTROPHILS NFR BLD AUTO: 65.7 % (ref 30–70)
PLATELET # BLD AUTO: 190 10*3/MM3 (ref 130–400)
PMV BLD AUTO: 10.4 FL (ref 6–10)
POST-BLOOD PRESSURE: NORMAL
PRE-BLOOD PRESSURE: NORMAL
PRE-HCT: 54.3
PRE-HGB: 18.2
PULSE: 74
RBC # BLD AUTO: 6.59 10*6/MM3 (ref 4.7–6.1)
VOLUME COLLECTED: 510
WBC NRBC COR # BLD: 8.17 10*3/MM3 (ref 4.5–12.5)

## 2017-11-21 PROCEDURE — 85025 COMPLETE CBC W/AUTO DIFF WBC: CPT | Performed by: UROLOGY

## 2017-11-21 PROCEDURE — 36415 COLL VENOUS BLD VENIPUNCTURE: CPT

## 2017-11-21 PROCEDURE — 99195 PHLEBOTOMY: CPT

## 2017-11-21 PROCEDURE — 99195 PHLEBOTOMY: CPT | Performed by: UROLOGY

## 2017-11-29 ENCOUNTER — OFFICE VISIT (OUTPATIENT)
Dept: UROLOGY | Facility: CLINIC | Age: 42
End: 2017-11-29

## 2017-11-29 VITALS — BODY MASS INDEX: 34.21 KG/M2 | HEIGHT: 67 IN | WEIGHT: 218 LBS

## 2017-11-29 DIAGNOSIS — N50.9 SCROTAL LESION: Primary | ICD-10-CM

## 2017-11-29 PROCEDURE — 99213 OFFICE O/P EST LOW 20 MIN: CPT | Performed by: UROLOGY

## 2017-11-29 NOTE — PROGRESS NOTES
"Chief Complaint:          Chief Complaint   Patient presents with   • Testicle Pain     Pt had a seizure almost 2 weeks ago and the next day noticed pain in the left testicle as well as a small \"spot\"       HPI:   42 y.o. male.  Pt had a seizure last week.  He went to ER and had a work up for this which pt reports as negative.   He had a hgb of 18.2 and he had a therapeutic phlebotomy last week for this.  He is on testosterone 1 cc every week.  Pt noticed a lesion on his left scrotum and that is why he is here today.  HPI        Past Medical History:        Past Medical History:   Diagnosis Date   • Fatigue    • Hyperlipidemia    • Hypertension    • Hypogonadism in male    • Seizures          Current Meds:     Current Outpatient Prescriptions   Medication Sig Dispense Refill   • amLODIPine (NORVASC) 5 MG tablet TK 1 T PO QD  5   • fluticasone (FLONASE) 50 MCG/ACT nasal spray SHAKE LQ AND U 1 SPR IEN BID  5   • lisinopril (PRINIVIL,ZESTRIL) 40 MG tablet TAKE ONE TABLET BY MOUTH EVERY DAY  5   • metoprolol tartrate (LOPRESSOR) 25 MG tablet Take 25 mg by mouth Daily.     • minocycline (MINOCIN,DYNACIN) 100 MG capsule TK ONE C PO D.  2   • pravastatin (PRAVACHOL) 40 MG tablet TK 1 T PO QD  5   • Testosterone Cypionate (DEPOTESTOTERONE CYPIONATE) 200 MG/ML injection INJECT 1 ML IN THE MUSCLE ONCE WEEKLY 4 mL 0     No current facility-administered medications for this visit.         Allergies:      Allergies   Allergen Reactions   • Fish-Derived Products      Scallops and mackeral   • Other      turnips        Past Surgical History:     Past Surgical History:   Procedure Laterality Date   • MOUTH SURGERY     • TONSILLECTOMY           Social History:     Social History     Social History   • Marital status:      Spouse name: N/A   • Number of children: N/A   • Years of education: N/A     Occupational History   • Not on file.     Social History Main Topics   • Smoking status: Former Smoker     Quit date: 4/29/2013 "   • Smokeless tobacco: Never Used   • Alcohol use No   • Drug use: No   • Sexual activity: Defer     Other Topics Concern   • Not on file     Social History Narrative       Family History:     Family History   Problem Relation Age of Onset   • Prostate cancer Other    • Diabetes Mother    • Thyroid disease Father    • Hyperlipidemia Father    • Diabetes Maternal Grandmother    • Anemia Paternal Grandmother        Review of Systems:     Review of Systems    Physical Exam:     Physical Exam   Constitutional: He is oriented to person, place, and time.   HENT:   Head: Normocephalic and atraumatic.   Right Ear: External ear normal.   Left Ear: External ear normal.   Nose: Nose normal.   Mouth/Throat: Oropharynx is clear and moist.   Eyes: Conjunctivae and EOM are normal. Pupils are equal, round, and reactive to light.   Neck: Normal range of motion. Neck supple. No thyromegaly present.   Cardiovascular: Normal rate, regular rhythm, normal heart sounds and intact distal pulses.    No murmur heard.  Pulmonary/Chest: Effort normal and breath sounds normal. No respiratory distress. He has no wheezes. He has no rales. He exhibits no tenderness.   Abdominal: Soft. Bowel sounds are normal. He exhibits no distension and no mass. There is no tenderness. No hernia.   Genitourinary: Rectum normal, prostate normal and penis normal.   Genitourinary Comments: Epididymal cyst 1 cm in size on the head of the left epididymis.   Musculoskeletal: Normal range of motion. He exhibits no edema or tenderness.   Lymphadenopathy:     He has no cervical adenopathy.   Neurological: He is alert and oriented to person, place, and time. No cranial nerve deficit. He exhibits normal muscle tone. Coordination normal.   Skin: Skin is warm. No rash noted.   Psychiatric: He has a normal mood and affect. His behavior is normal. Judgment and thought content normal.   Nursing note and vitals reviewed.      Procedure:     No notes on file      Assessment:      Encounter Diagnosis   Name Primary?   • Scrotal lesion Yes     Orders Placed This Encounter   Procedures   • US Scrotum & Testicles     Standing Status:   Future     Standing Expiration Date:   11/29/2018     Order Specific Question:   Reason for Exam:     Answer:   probable left epididymal cyst at the head of the epididymis       Plan:   Will order a scrotal ultrasound and will see him in a month to review this.    Counseling was given to patient for the following topics instructions for management. and the interim medical history and current results were reviewed.  A treatment plan with follow-up was made. Total time of the encounter was 23 minutes and 23 minutes were spent discussing Scrotal lesion [N50.9] face-to-face.        This document has been electronically signed by Valdez Fisher MD November 29, 2017 10:03 AM

## 2017-12-28 ENCOUNTER — LAB (OUTPATIENT)
Dept: UROLOGY | Facility: CLINIC | Age: 42
End: 2017-12-28

## 2017-12-28 DIAGNOSIS — E29.1 HYPOGONADISM, TESTICULAR: ICD-10-CM

## 2017-12-28 LAB
ALBUMIN SERPL-MCNC: 4.5 G/DL (ref 3.5–5)
ALP SERPL-CCNC: 59 U/L (ref 40–129)
ALT SERPL W P-5'-P-CCNC: 43 U/L (ref 10–44)
AST SERPL-CCNC: 29 U/L (ref 10–34)
BILIRUB CONJ SERPL-MCNC: 0.2 MG/DL (ref 0–0.2)
BILIRUB INDIRECT SERPL-MCNC: 0.6 MG/DL
BILIRUB SERPL-MCNC: 0.8 MG/DL (ref 0.2–1.8)
DEPRECATED RDW RBC AUTO: 39.7 FL (ref 37–54)
ERYTHROCYTE [DISTWIDTH] IN BLOOD BY AUTOMATED COUNT: 13.5 % (ref 11.5–14.5)
HCT VFR BLD AUTO: 52.3 % (ref 42–52)
HGB BLD-MCNC: 17.5 G/DL (ref 14–18)
MCH RBC QN AUTO: 27.1 PG (ref 27–33)
MCHC RBC AUTO-ENTMCNC: 33.5 G/DL (ref 33–37)
MCV RBC AUTO: 81 FL (ref 80–94)
PLATELET # BLD AUTO: 218 10*3/MM3 (ref 130–400)
PMV BLD AUTO: 11.1 FL (ref 6–10)
PROT SERPL-MCNC: 7.1 G/DL (ref 6–8)
RBC # BLD AUTO: 6.46 10*6/MM3 (ref 4.7–6.1)
WBC NRBC COR # BLD: 8.87 10*3/MM3 (ref 4.5–12.5)

## 2017-12-28 PROCEDURE — 85027 COMPLETE CBC AUTOMATED: CPT | Performed by: UROLOGY

## 2017-12-28 PROCEDURE — 84403 ASSAY OF TOTAL TESTOSTERONE: CPT | Performed by: UROLOGY

## 2017-12-28 PROCEDURE — 80076 HEPATIC FUNCTION PANEL: CPT | Performed by: UROLOGY

## 2017-12-29 LAB — TESTOST SERPL-MCNC: 1346.61 NG/DL (ref 123.06–813.86)

## 2018-01-12 ENCOUNTER — HOSPITAL ENCOUNTER (OUTPATIENT)
Dept: ULTRASOUND IMAGING | Facility: HOSPITAL | Age: 43
Discharge: HOME | End: 2018-01-12
Attending: UROLOGY | Admitting: UROLOGY

## 2018-01-12 PROCEDURE — 76870 US EXAM SCROTUM: CPT | Performed by: RADIOLOGY

## 2018-01-12 PROCEDURE — 76870 US EXAM SCROTUM: CPT

## 2018-01-24 ENCOUNTER — OFFICE VISIT (OUTPATIENT)
Dept: UROLOGY | Facility: CLINIC | Age: 43
End: 2018-01-24

## 2018-01-24 DIAGNOSIS — E29.1 HYPOGONADISM IN MALE: Primary | ICD-10-CM

## 2018-01-24 PROCEDURE — 99213 OFFICE O/P EST LOW 20 MIN: CPT | Performed by: UROLOGY

## 2018-01-24 NOTE — PROGRESS NOTES
Chief Complaint:          Chief Complaint   Patient presents with   • Scrotal Lesion       HPI:   42 y.o. male.    HPI  Has had a negative mri related to his seizure.  He has been on Kepra.  For seizures.  He will be able to drive in next month.  Scrotal ultrasound was normal.    Past Medical History:        Past Medical History:   Diagnosis Date   • Fatigue    • Hyperlipidemia    • Hypertension    • Hypogonadism in male    • Seizures          Current Meds:     Current Outpatient Prescriptions   Medication Sig Dispense Refill   • amLODIPine (NORVASC) 5 MG tablet TK 1 T PO QD  5   • fluticasone (FLONASE) 50 MCG/ACT nasal spray SHAKE LQ AND U 1 SPR IEN BID  5   • lisinopril (PRINIVIL,ZESTRIL) 40 MG tablet TAKE ONE TABLET BY MOUTH EVERY DAY  5   • metoprolol tartrate (LOPRESSOR) 25 MG tablet Take 25 mg by mouth Daily.     • minocycline (MINOCIN,DYNACIN) 100 MG capsule TK ONE C PO D.  2   • pravastatin (PRAVACHOL) 40 MG tablet TK 1 T PO QD  5   • Testosterone Cypionate (DEPOTESTOTERONE CYPIONATE) 200 MG/ML injection INJECT 1 ML IN THE MUSCLE ONCE WEEKLY 4 mL 0     No current facility-administered medications for this visit.         Allergies:      Allergies   Allergen Reactions   • Fish-Derived Products      Scallops and mackeral   • Other      turnips        Past Surgical History:     Past Surgical History:   Procedure Laterality Date   • MOUTH SURGERY     • TONSILLECTOMY           Social History:     Social History     Social History   • Marital status:      Spouse name: N/A   • Number of children: N/A   • Years of education: N/A     Occupational History   • Not on file.     Social History Main Topics   • Smoking status: Former Smoker     Quit date: 4/29/2013   • Smokeless tobacco: Never Used   • Alcohol use No   • Drug use: No   • Sexual activity: Defer     Other Topics Concern   • Not on file     Social History Narrative       Family History:     Family History   Problem Relation Age of Onset   • Prostate  cancer Other    • Diabetes Mother    • Thyroid disease Father    • Hyperlipidemia Father    • Diabetes Maternal Grandmother    • Anemia Paternal Grandmother        Review of Systems:     Review of Systems        Physical Exam:     Physical Exam    Procedure:     No notes on file      Assessment:     Encounter Diagnosis   Name Primary?   • Hypogonadism in male Yes     Orders Placed This Encounter   Procedures   • CBC (No Diff)     Standing Status:   Future     Standing Expiration Date:   1/24/2021   • Hepatic Function Panel     Standing Status:   Future     Standing Expiration Date:   1/24/2021   • Testosterone     Standing Status:   Future     Standing Expiration Date:   1/24/2020       Plan:   Will check labs in a month.  Will see him back in [facility]    Counseling was given to patient for the following topics instructions for management as follows: hypogonadism. The interim medical history and current results were reviewed.  A treatment plan with follow-up was made for Hypogonadism in male [E29.1]. I spent 21 minutes face to face with Bentley Monaco and 56 percentage was spent in counseling.    This document has been electronically signed by Valdez Fisher MD January 24, 2018 9:42 AM

## 2018-02-01 ENCOUNTER — APPOINTMENT (OUTPATIENT)
Dept: ULTRASOUND IMAGING | Facility: HOSPITAL | Age: 43
End: 2018-02-01
Attending: UROLOGY

## 2018-02-01 ENCOUNTER — HOSPITAL ENCOUNTER (OUTPATIENT)
Dept: ULTRASOUND IMAGING | Facility: HOSPITAL | Age: 43
Discharge: HOME | End: 2018-02-01
Attending: UROLOGY

## 2018-02-01 DIAGNOSIS — N50.9 SCROTAL LESION: ICD-10-CM

## 2018-02-03 DIAGNOSIS — R79.89 LOW TESTOSTERONE: ICD-10-CM

## 2018-02-05 RX ORDER — TESTOSTERONE CYPIONATE 200 MG/ML
INJECTION, SOLUTION INTRAMUSCULAR
Qty: 10 ML | Refills: 0 | OUTPATIENT
Start: 2018-02-05

## 2018-02-07 ENCOUNTER — TELEPHONE (OUTPATIENT)
Dept: UROLOGY | Facility: CLINIC | Age: 43
End: 2018-02-07

## 2018-02-16 ENCOUNTER — LAB (OUTPATIENT)
Dept: UROLOGY | Facility: CLINIC | Age: 43
End: 2018-02-16

## 2018-02-16 DIAGNOSIS — E29.1 HYPOGONADISM IN MALE: ICD-10-CM

## 2018-02-16 LAB
ALBUMIN SERPL-MCNC: 4.7 G/DL (ref 3.5–5)
ALP SERPL-CCNC: 63 U/L (ref 40–129)
ALT SERPL W P-5'-P-CCNC: 40 U/L (ref 10–44)
AST SERPL-CCNC: 29 U/L (ref 10–34)
BILIRUB CONJ SERPL-MCNC: 0.3 MG/DL (ref 0–0.2)
BILIRUB INDIRECT SERPL-MCNC: 0.6 MG/DL
BILIRUB SERPL-MCNC: 0.9 MG/DL (ref 0.2–1.8)
DEPRECATED RDW RBC AUTO: 41.7 FL (ref 37–54)
ERYTHROCYTE [DISTWIDTH] IN BLOOD BY AUTOMATED COUNT: 14.4 % (ref 11.5–14.5)
HCT VFR BLD AUTO: 51.5 % (ref 42–52)
HGB BLD-MCNC: 17.3 G/DL (ref 14–18)
MCH RBC QN AUTO: 27.4 PG (ref 27–33)
MCHC RBC AUTO-ENTMCNC: 33.6 G/DL (ref 33–37)
MCV RBC AUTO: 81.5 FL (ref 80–94)
PLATELET # BLD AUTO: 187 10*3/MM3 (ref 130–400)
PMV BLD AUTO: 11.3 FL (ref 6–10)
PROT SERPL-MCNC: 7 G/DL (ref 6–8)
RBC # BLD AUTO: 6.32 10*6/MM3 (ref 4.7–6.1)
TESTOST SERPL-MCNC: 178.97 NG/DL (ref 123.06–813.86)
WBC NRBC COR # BLD: 9.7 10*3/MM3 (ref 4.5–12.5)

## 2018-02-16 PROCEDURE — 36415 COLL VENOUS BLD VENIPUNCTURE: CPT | Performed by: UROLOGY

## 2018-02-16 PROCEDURE — 84403 ASSAY OF TOTAL TESTOSTERONE: CPT | Performed by: UROLOGY

## 2018-02-16 PROCEDURE — 85027 COMPLETE CBC AUTOMATED: CPT | Performed by: UROLOGY

## 2018-02-16 PROCEDURE — 80076 HEPATIC FUNCTION PANEL: CPT | Performed by: UROLOGY

## 2018-02-28 ENCOUNTER — OFFICE VISIT (OUTPATIENT)
Dept: UROLOGY | Facility: CLINIC | Age: 43
End: 2018-02-28

## 2018-02-28 VITALS — HEIGHT: 67 IN | WEIGHT: 218 LBS | BODY MASS INDEX: 34.21 KG/M2

## 2018-02-28 DIAGNOSIS — E29.1 HYPOGONADISM, TESTICULAR: Primary | ICD-10-CM

## 2018-02-28 PROCEDURE — 99213 OFFICE O/P EST LOW 20 MIN: CPT | Performed by: UROLOGY

## 2018-02-28 NOTE — PROGRESS NOTES
Chief Complaint:          Chief Complaint   Patient presents with   • Hypogonadism     1 month follow up        HPI:   42 y.o. male.    HPI  Pt's testosterone is 178.97 which is within the reference range of normals that our lab notes. off testosterone for 4 weeks.  Pt has lost 60 lbs.  Pt feels tired and not as much energy.  LFT's are normal.  Pt's hgb is 17.3      Past Medical History:        Past Medical History:   Diagnosis Date   • Fatigue    • Hyperlipidemia    • Hypertension    • Hypogonadism in male    • Seizures          Current Meds:     Current Outpatient Prescriptions   Medication Sig Dispense Refill   • amLODIPine (NORVASC) 5 MG tablet TK 1 T PO QD  5   • fluticasone (FLONASE) 50 MCG/ACT nasal spray SHAKE LQ AND U 1 SPR IEN BID  5   • lisinopril (PRINIVIL,ZESTRIL) 40 MG tablet TAKE ONE TABLET BY MOUTH EVERY DAY  5   • metoprolol tartrate (LOPRESSOR) 25 MG tablet Take 25 mg by mouth Daily.     • minocycline (MINOCIN,DYNACIN) 100 MG capsule TK ONE C PO D.  2   • pravastatin (PRAVACHOL) 40 MG tablet TK 1 T PO QD  5   • Testosterone Cypionate (DEPOTESTOTERONE CYPIONATE) 200 MG/ML injection INJECT 1 ML IN THE MUSCLE ONCE WEEKLY 4 mL 0     No current facility-administered medications for this visit.         Allergies:      Allergies   Allergen Reactions   • Fish-Derived Products      Scallops and mackeral   • Other      turnips        Past Surgical History:     Past Surgical History:   Procedure Laterality Date   • MOUTH SURGERY     • TONSILLECTOMY           Social History:     Social History     Social History   • Marital status:      Spouse name: N/A   • Number of children: N/A   • Years of education: N/A     Occupational History   • Not on file.     Social History Main Topics   • Smoking status: Former Smoker     Quit date: 4/29/2013   • Smokeless tobacco: Never Used   • Alcohol use No   • Drug use: No   • Sexual activity: Defer     Other Topics Concern   • Not on file     Social History Narrative        Family History:     Family History   Problem Relation Age of Onset   • Prostate cancer Other    • Diabetes Mother    • Thyroid disease Father    • Hyperlipidemia Father    • Diabetes Maternal Grandmother    • Anemia Paternal Grandmother        Review of Systems:     Review of Systems   Constitutional: Negative.    HENT: Negative.    Gastrointestinal: Negative.    Genitourinary: Negative.        Physical Exam:     Physical Exam    Procedure:     No notes on file      Assessment:     Encounter Diagnosis   Name Primary?   • Hypogonadism, testicular Yes     No orders of the defined types were placed in this encounter.      Plan:   Pt has a normal testosterone currently even though he has been on hormone replacement therapy.  I suggested that we try staying off testosterone for 6 months to see if his natural testosterone continues to go higher in the normal range.  Pt is tired and this could be related to his hormone situation or his medication or other issues.    Counseling was given to patient for the following topics instructions for management. and the interim medical history and current results were reviewed.  A treatment plan with follow-up was made. Total time of the encounter was 21 minutes and 21 minutes were spent discussing Hypogonadism, testicular [E29.1] face-to-face.    Patient's BMI is within normal parameters. No follow-up required.   This document has been electronically signed by Valdez Fisher MD February 28, 2018 9:06 AM

## 2019-04-18 ENCOUNTER — OFFICE VISIT (OUTPATIENT)
Dept: FAMILY MEDICINE CLINIC | Facility: CLINIC | Age: 44
End: 2019-04-18

## 2019-04-18 VITALS
OXYGEN SATURATION: 99 % | HEART RATE: 64 BPM | TEMPERATURE: 98.1 F | HEIGHT: 67 IN | BODY MASS INDEX: 35.16 KG/M2 | WEIGHT: 224 LBS | SYSTOLIC BLOOD PRESSURE: 120 MMHG | DIASTOLIC BLOOD PRESSURE: 80 MMHG

## 2019-04-18 DIAGNOSIS — E55.9 VITAMIN D DEFICIENCY: ICD-10-CM

## 2019-04-18 DIAGNOSIS — E78.5 HYPERLIPIDEMIA, UNSPECIFIED HYPERLIPIDEMIA TYPE: ICD-10-CM

## 2019-04-18 DIAGNOSIS — Z86.69 HX OF SEIZURE DISORDER: ICD-10-CM

## 2019-04-18 DIAGNOSIS — R53.83 OTHER FATIGUE: ICD-10-CM

## 2019-04-18 DIAGNOSIS — E34.9 TESTOSTERONE DEFICIENCY: Primary | ICD-10-CM

## 2019-04-18 DIAGNOSIS — I10 ESSENTIAL HYPERTENSION: ICD-10-CM

## 2019-04-18 DIAGNOSIS — Z91.09 ENVIRONMENTAL ALLERGIES: ICD-10-CM

## 2019-04-18 LAB
25(OH)D3 SERPL-MCNC: 26.7 NG/ML (ref 30–100)
ALBUMIN SERPL-MCNC: 4.7 G/DL (ref 3.5–5.2)
ALBUMIN/GLOB SERPL: 1.6 G/DL
ALP SERPL-CCNC: 66 U/L (ref 39–117)
ALT SERPL W P-5'-P-CCNC: 33 U/L (ref 1–41)
ANION GAP SERPL CALCULATED.3IONS-SCNC: 12.5 MMOL/L
AST SERPL-CCNC: 21 U/L (ref 1–40)
BASOPHILS # BLD AUTO: 0.04 10*3/MM3 (ref 0–0.2)
BASOPHILS NFR BLD AUTO: 0.7 % (ref 0–1.5)
BILIRUB SERPL-MCNC: 0.4 MG/DL (ref 0.2–1.2)
BUN BLD-MCNC: 22 MG/DL (ref 6–20)
BUN/CREAT SERPL: 20.8 (ref 7–25)
CALCIUM SPEC-SCNC: 10.6 MG/DL (ref 8.6–10.5)
CHLORIDE SERPL-SCNC: 102 MMOL/L (ref 98–107)
CHOLEST SERPL-MCNC: 197 MG/DL (ref 0–200)
CO2 SERPL-SCNC: 24.5 MMOL/L (ref 22–29)
CREAT BLD-MCNC: 1.06 MG/DL (ref 0.76–1.27)
DEPRECATED RDW RBC AUTO: 38.8 FL (ref 37–54)
EOSINOPHIL # BLD AUTO: 0.13 10*3/MM3 (ref 0–0.4)
EOSINOPHIL NFR BLD AUTO: 2.1 % (ref 0.3–6.2)
ERYTHROCYTE [DISTWIDTH] IN BLOOD BY AUTOMATED COUNT: 12.1 % (ref 12.3–15.4)
GFR SERPL CREATININE-BSD FRML MDRD: 77 ML/MIN/1.73
GLOBULIN UR ELPH-MCNC: 2.9 GM/DL
GLUCOSE BLD-MCNC: 95 MG/DL (ref 65–99)
HCT VFR BLD AUTO: 51.8 % (ref 37.5–51)
HDLC SERPL-MCNC: 59 MG/DL (ref 40–60)
HGB BLD-MCNC: 16.7 G/DL (ref 13–17.7)
IMM GRANULOCYTES # BLD AUTO: 0.03 10*3/MM3 (ref 0–0.05)
IMM GRANULOCYTES NFR BLD AUTO: 0.5 % (ref 0–0.5)
LDLC SERPL CALC-MCNC: 121 MG/DL (ref 0–100)
LDLC/HDLC SERPL: 2.04 {RATIO}
LYMPHOCYTES # BLD AUTO: 2.02 10*3/MM3 (ref 0.7–3.1)
LYMPHOCYTES NFR BLD AUTO: 33 % (ref 19.6–45.3)
MAGNESIUM SERPL-MCNC: 2.4 MG/DL (ref 1.6–2.6)
MCH RBC QN AUTO: 28.3 PG (ref 26.6–33)
MCHC RBC AUTO-ENTMCNC: 32.2 G/DL (ref 31.5–35.7)
MCV RBC AUTO: 87.6 FL (ref 79–97)
MONOCYTES # BLD AUTO: 0.45 10*3/MM3 (ref 0.1–0.9)
MONOCYTES NFR BLD AUTO: 7.4 % (ref 5–12)
NEUTROPHILS # BLD AUTO: 3.45 10*3/MM3 (ref 1.4–7)
NEUTROPHILS NFR BLD AUTO: 56.3 % (ref 42.7–76)
NRBC BLD AUTO-RTO: 0 /100 WBC (ref 0–0)
PLATELET # BLD AUTO: 210 10*3/MM3 (ref 140–450)
PMV BLD AUTO: 12.1 FL (ref 6–12)
POTASSIUM BLD-SCNC: 4.9 MMOL/L (ref 3.5–5.2)
PROT SERPL-MCNC: 7.6 G/DL (ref 6–8.5)
RBC # BLD AUTO: 5.91 10*6/MM3 (ref 4.14–5.8)
SODIUM BLD-SCNC: 139 MMOL/L (ref 136–145)
T4 FREE SERPL-MCNC: 1.17 NG/DL (ref 0.93–1.7)
TRIGL SERPL-MCNC: 87 MG/DL (ref 0–150)
TSH SERPL DL<=0.05 MIU/L-ACNC: 1.01 MIU/ML (ref 0.27–4.2)
VIT B12 BLD-MCNC: 689 PG/ML (ref 211–946)
VLDLC SERPL-MCNC: 17.4 MG/DL (ref 5–40)
WBC NRBC COR # BLD: 6.12 10*3/MM3 (ref 3.4–10.8)

## 2019-04-18 PROCEDURE — 80061 LIPID PANEL: CPT | Performed by: NURSE PRACTITIONER

## 2019-04-18 PROCEDURE — 93000 ELECTROCARDIOGRAM COMPLETE: CPT | Performed by: NURSE PRACTITIONER

## 2019-04-18 PROCEDURE — 82306 VITAMIN D 25 HYDROXY: CPT | Performed by: NURSE PRACTITIONER

## 2019-04-18 PROCEDURE — 85025 COMPLETE CBC W/AUTO DIFF WBC: CPT | Performed by: NURSE PRACTITIONER

## 2019-04-18 PROCEDURE — 82607 VITAMIN B-12: CPT | Performed by: NURSE PRACTITIONER

## 2019-04-18 PROCEDURE — 84402 ASSAY OF FREE TESTOSTERONE: CPT | Performed by: NURSE PRACTITIONER

## 2019-04-18 PROCEDURE — 84403 ASSAY OF TOTAL TESTOSTERONE: CPT | Performed by: NURSE PRACTITIONER

## 2019-04-18 PROCEDURE — 83735 ASSAY OF MAGNESIUM: CPT | Performed by: NURSE PRACTITIONER

## 2019-04-18 PROCEDURE — 84443 ASSAY THYROID STIM HORMONE: CPT | Performed by: NURSE PRACTITIONER

## 2019-04-18 PROCEDURE — 99214 OFFICE O/P EST MOD 30 MIN: CPT | Performed by: NURSE PRACTITIONER

## 2019-04-18 PROCEDURE — 84439 ASSAY OF FREE THYROXINE: CPT | Performed by: NURSE PRACTITIONER

## 2019-04-18 PROCEDURE — 80053 COMPREHEN METABOLIC PANEL: CPT | Performed by: NURSE PRACTITIONER

## 2019-04-18 RX ORDER — ERGOCALCIFEROL 1.25 MG/1
CAPSULE ORAL
Refills: 0 | COMMUNITY
Start: 2019-02-06 | End: 2019-04-18

## 2019-04-18 RX ORDER — LEVETIRACETAM 500 MG/1
500 TABLET ORAL 2 TIMES DAILY
Refills: 5 | COMMUNITY
Start: 2019-03-22 | End: 2020-03-16 | Stop reason: SDUPTHER

## 2019-04-18 RX ORDER — LISINOPRIL 10 MG/1
5 TABLET ORAL DAILY
Qty: 90 TABLET | Refills: 3 | Status: SHIPPED | OUTPATIENT
Start: 2019-04-18 | End: 2020-03-16 | Stop reason: SDUPTHER

## 2019-04-18 RX ORDER — FLUTICASONE PROPIONATE 50 MCG
2 SPRAY, SUSPENSION (ML) NASAL DAILY
Qty: 48 G | Refills: 3 | Status: SHIPPED | OUTPATIENT
Start: 2019-04-18 | End: 2020-03-16 | Stop reason: SDUPTHER

## 2019-04-18 RX ORDER — PRAVASTATIN SODIUM 40 MG
40 TABLET ORAL NIGHTLY
Qty: 90 TABLET | Refills: 3 | Status: SHIPPED | OUTPATIENT
Start: 2019-04-18 | End: 2020-03-16 | Stop reason: SDUPTHER

## 2019-04-18 RX ORDER — LISINOPRIL 10 MG/1
5 TABLET ORAL DAILY
Refills: 11 | COMMUNITY
Start: 2019-03-22 | End: 2019-04-18 | Stop reason: SDUPTHER

## 2019-04-18 NOTE — PROGRESS NOTES
Subjective   Bentley Monaco is a 43 y.o. male.     Chief Complaint: Establish Care    Hypertension   This is a chronic problem. The current episode started more than 1 year ago. The problem is unchanged. The problem is controlled. Associated symptoms include malaise/fatigue. Pertinent negatives include no chest pain, palpitations, peripheral edema or shortness of breath. There are no associated agents to hypertension. Risk factors for coronary artery disease include dyslipidemia, male gender, obesity and sedentary lifestyle (Father has a history of atrial fib). Current antihypertension treatment includes beta blockers and ACE inhibitors. The current treatment provides significant improvement. Compliance problems include exercise and diet.    Hyperlipidemia   This is a chronic (Patient states that his father has a history of atrial fib and he was placed on a statin due to his family history of heart disease) problem. The current episode started more than 1 year ago. Recent lipid tests were reviewed and are variable. Exacerbating diseases include obesity. Factors aggravating his hyperlipidemia include beta blockers. Pertinent negatives include no chest pain or shortness of breath. Current antihyperlipidemic treatment includes statins. The current treatment provides significant improvement of lipids. Compliance problems include adherence to exercise and adherence to diet.  Risk factors for coronary artery disease include dyslipidemia, male sex, obesity and a sedentary lifestyle.   Fatigue   This is a chronic problem. The current episode started more than 1 year ago. The problem occurs daily. The problem has been waxing and waning. Associated symptoms include fatigue. Pertinent negatives include no chest pain. Associated symptoms comments: Patient states that he was diagnosed with low testosterone level approximately 2 years ago.  At one point he was receiving injections from urologist, Dr. Fisher.  He states he was having  issues with staying awake through the day at that point.  He has not had any testosterone injection in several months.  His fatigue had resolved after starting the injections but since he has not had any in several months his fatigue has returned.  He denies any issues with urination or sexual performance.  He has not had his testosterone level checked in several months and he would like to have that checked again today.  If needed he will return to urology for evaluation and treatment.. Nothing aggravates the symptoms.   Seizures    This is a chronic (Patient states that he had his first seizure in 2017.  It appears that he has had a total of 2 or 3 seizures in the past.  He is being followed by neurology and is currently taking Keppra.  He denies any further seizures since 2017) problem. The current episode started more than 1 week ago. The problem has been resolved. Pertinent negatives include no chest pain. The episode was witnessed. There was the sensation of an aura present.   URI    This is a chronic (Patient has a history of seasonal allergies.  He is currently using Flonase and taking Claritin daily.  He states that this time of year is the worst for his symptoms.  After the springtime he will only take his antihistamines as needed.) problem. Pertinent negatives include no chest pain.      Vitamin D deficiency  Patient states that his vitamin D level has been low on 2 different occasions.  He has just finished a round of vitamin D supplementation weekly; he states that he finished the prescription about 3 weeks ago.    Patient is here to establish care today.  Patient had been following with Dr. Stephens in [place].  He was not happy with the care he was receiving and would like to transfer his care to our office.  Patient has a history of hypertension, hyperlipidemia, seizures, seasonal allergies and hypo-testosteronism.        Patient's Body mass index is 35.08 kg/m². BMI is above normal parameters.  Recommendations include: educational material.    Family History   Problem Relation Age of Onset   • Prostate cancer Other    • Diabetes Mother    • Thyroid disease Father    • Hyperlipidemia Father    • Atrial fibrillation Father    • Diabetes Maternal Grandmother    • Anemia Paternal Grandmother        Social History     Socioeconomic History   • Marital status:      Spouse name: Not on file   • Number of children: Not on file   • Years of education: Not on file   • Highest education level: Not on file   Tobacco Use   • Smoking status: Former Smoker     Last attempt to quit: 2013     Years since quittin.9   • Smokeless tobacco: Never Used   Substance and Sexual Activity   • Alcohol use: No   • Drug use: No   • Sexual activity: Defer       Past Medical History:   Diagnosis Date   • Fatigue    • Hyperlipidemia    • Hypertension    • Hypogonadism in male    • Low testosterone    • Seizures (CMS/HCC)        Review of Systems   Constitutional: Positive for fatigue and malaise/fatigue.   HENT: Negative.    Respiratory: Negative.  Negative for shortness of breath.    Cardiovascular: Negative.  Negative for chest pain and palpitations.   Gastrointestinal: Negative.    Musculoskeletal: Negative.    Skin: Negative.    Neurological: Positive for seizures.   Psychiatric/Behavioral: Negative.        Objective   Physical Exam   Constitutional: He is oriented to person, place, and time. He appears well-developed and well-nourished.   obesity   Neck: Normal range of motion. Neck supple.   Cardiovascular: Normal rate, regular rhythm and normal heart sounds.   Pulmonary/Chest: Effort normal and breath sounds normal.   Neurological: He is alert and oriented to person, place, and time.   Skin: Skin is warm and dry.   Psychiatric: He has a normal mood and affect. His behavior is normal. Judgment and thought content normal.   Nursing note and vitals reviewed.        ECG 12 Lead  Date/Time: 2019 10:16 AM  Performed  "by: Yue Clark APRN  Authorized by: Yue Clark APRN   Comparison: not compared with previous ECG   Rhythm: sinus rhythm  Rate: normal  Conduction: conduction normal  ST Segments: ST segments normal  T Waves: T waves normal  QRS axis: normal  Other: no other findings    Clinical impression: normal ECG            Vitals: Blood pressure 120/80, pulse 64, temperature 98.1 °F (36.7 °C), temperature source Oral, height 170.2 cm (67\"), weight 102 kg (224 lb), SpO2 99 %.    Allergies:   Allergies   Allergen Reactions   • Fish-Derived Products      Scallops and mackeral   • Other      turnips        During this visit the following were done:  Labs Reviewed []    Labs Ordered []    Radiology Reports Reviewed []    Radiology Ordered []    PCP Records Reviewed []    Referring Provider Records Reviewed []    ER Records Reviewed []    Hospital Records Reviewed []    History Obtained From Family []    Radiology Images Reviewed []    Other Reviewed []    Records Requested []      Assessment/Plan   Bentley was seen today for establish care.    Diagnoses and all orders for this visit:    Testosterone deficiency  -     CBC & Differential  -     Comprehensive Metabolic Panel  -     Magnesium  -     TSH  -     T4, Free  -     Vitamin B12  -     Vitamin D 25 Hydroxy  -     Testosterone, Free, Total  -     CBC Auto Differential    Essential hypertension  -     lisinopril (PRINIVIL,ZESTRIL) 10 MG tablet; Take 0.5 tablets by mouth Daily.  -     metoprolol tartrate (LOPRESSOR) 25 MG tablet; Take 1 tablet by mouth Daily.  -     CBC & Differential  -     Comprehensive Metabolic Panel  -     Magnesium  -     TSH  -     T4, Free  -     Vitamin B12  -     Vitamin D 25 Hydroxy  -     ECG 12 Lead  -     CBC Auto Differential    Hyperlipidemia, unspecified hyperlipidemia type  -     pravastatin (PRAVACHOL) 40 MG tablet; Take 1 tablet by mouth Every Night.  -     CBC & Differential  -     Comprehensive Metabolic Panel  -     " Lipid Panel  -     Magnesium  -     TSH  -     T4, Free  -     Vitamin B12  -     Vitamin D 25 Hydroxy  -     ECG 12 Lead  -     CBC Auto Differential    Hx of seizure disorder  -     CBC & Differential  -     Comprehensive Metabolic Panel  -     Magnesium  -     TSH  -     T4, Free  -     Vitamin B12  -     Vitamin D 25 Hydroxy  -     CBC Auto Differential    Environmental allergies  -     fluticasone (FLONASE) 50 MCG/ACT nasal spray; 2 sprays into the nostril(s) as directed by provider Daily.  -     CBC & Differential  -     Comprehensive Metabolic Panel  -     Magnesium  -     TSH  -     T4, Free  -     Vitamin B12  -     Vitamin D 25 Hydroxy  -     CBC Auto Differential    Vitamin D deficiency  -     Vitamin D 25 Hydroxy    Other fatigue  -     Vitamin B12

## 2019-04-18 NOTE — PATIENT INSTRUCTIONS

## 2019-04-19 ENCOUNTER — TELEPHONE (OUTPATIENT)
Dept: FAMILY MEDICINE CLINIC | Facility: CLINIC | Age: 44
End: 2019-04-19

## 2019-04-19 RX ORDER — CHOLECALCIFEROL (VITAMIN D3) 50 MCG
2000 TABLET ORAL DAILY
Qty: 30 TABLET | Refills: 11
Start: 2019-04-19 | End: 2020-04-18

## 2019-04-19 NOTE — TELEPHONE ENCOUNTER
----- Message from EVERETT Acuna sent at 4/19/2019  9:32 AM EDT -----  His labs look ok.  His Vit D is a little low and he just finished two rounds of Vit D.  I would suggest that he  2000iu otc of Vit D and take daily to keep it up to normal.   His LDL is a little elevated.  He is currently taking a statin.  Tell him he needs to watch his fried greasy food intake.  And we can recheck it in 6 months.  His testosterone level is not back yet.  It will probably be back next week.  Please let him know.       Left a message to return call.      Patient returned call & verbalized understanding.

## 2019-04-19 NOTE — PROGRESS NOTES
His labs look ok.  His Vit D is a little low and he just finished two rounds of Vit D.  I would suggest that he  2000iu otc of Vit D and take daily to keep it up to normal.   His LDL is a little elevated.  He is currently taking a statin.  Tell him he needs to watch his fried greasy food intake.  And we can recheck it in 6 months.  His testosterone level is not back yet.  It will probably be back next week.  Please let him know.

## 2019-04-20 LAB
TESTOST FREE SERPL-MCNC: 5.1 PG/ML (ref 6.8–21.5)
TESTOST SERPL-MCNC: 267 NG/DL (ref 264–916)

## 2019-04-23 ENCOUNTER — TELEPHONE (OUTPATIENT)
Dept: FAMILY MEDICINE CLINIC | Facility: CLINIC | Age: 44
End: 2019-04-23

## 2019-04-23 NOTE — TELEPHONE ENCOUNTER
----- Message from EVERETT Acuna sent at 4/23/2019 10:22 AM EDT -----  His total testosterone is normal but on low side at 267.  His free testosterone level is low at 5.1.  He may want to see urology for evaluation.   Please let him know.       Spoke with patient & he verbalized understanding,he reports he will call Dr. Fisher's office & schedule himself a follow up.

## 2019-04-23 NOTE — PROGRESS NOTES
His total testosterone is normal but on low side at 267.  His free testosterone level is low at 5.1.  He may want to see urology for evaluation.   Please let him know.

## 2019-09-17 ENCOUNTER — LAB (OUTPATIENT)
Dept: LAB | Facility: HOSPITAL | Age: 44
End: 2019-09-17

## 2019-09-17 DIAGNOSIS — E78.5 HYPERLIPIDEMIA, UNSPECIFIED HYPERLIPIDEMIA TYPE: ICD-10-CM

## 2019-09-17 DIAGNOSIS — E29.1 HYPOGONADISM, TESTICULAR: ICD-10-CM

## 2019-09-17 LAB
DEPRECATED RDW RBC AUTO: 41.1 FL (ref 37–54)
ERYTHROCYTE [DISTWIDTH] IN BLOOD BY AUTOMATED COUNT: 12.7 % (ref 12.3–15.4)
HCT VFR BLD AUTO: 49.6 % (ref 37.5–51)
HGB BLD-MCNC: 15.8 G/DL (ref 13–17.7)
MCH RBC QN AUTO: 28.4 PG (ref 26.6–33)
MCHC RBC AUTO-ENTMCNC: 31.9 G/DL (ref 31.5–35.7)
MCV RBC AUTO: 89.2 FL (ref 79–97)
PLATELET # BLD AUTO: 226 10*3/MM3 (ref 140–450)
PMV BLD AUTO: 11.9 FL (ref 6–12)
RBC # BLD AUTO: 5.56 10*6/MM3 (ref 4.14–5.8)
TESTOST SERPL-MCNC: 285 NG/DL (ref 249–836)
WBC NRBC COR # BLD: 9.67 10*3/MM3 (ref 3.4–10.8)

## 2019-09-17 PROCEDURE — 85027 COMPLETE CBC AUTOMATED: CPT

## 2019-09-17 PROCEDURE — 84403 ASSAY OF TOTAL TESTOSTERONE: CPT

## 2019-09-17 PROCEDURE — 80061 LIPID PANEL: CPT

## 2019-09-17 PROCEDURE — 36415 COLL VENOUS BLD VENIPUNCTURE: CPT

## 2019-09-18 ENCOUNTER — OFFICE VISIT (OUTPATIENT)
Dept: FAMILY MEDICINE CLINIC | Facility: CLINIC | Age: 44
End: 2019-09-18

## 2019-09-18 ENCOUNTER — TELEPHONE (OUTPATIENT)
Dept: FAMILY MEDICINE CLINIC | Facility: CLINIC | Age: 44
End: 2019-09-18

## 2019-09-18 VITALS
TEMPERATURE: 98.3 F | SYSTOLIC BLOOD PRESSURE: 110 MMHG | HEIGHT: 67 IN | WEIGHT: 211 LBS | DIASTOLIC BLOOD PRESSURE: 80 MMHG | BODY MASS INDEX: 33.12 KG/M2 | OXYGEN SATURATION: 99 % | HEART RATE: 66 BPM

## 2019-09-18 DIAGNOSIS — I10 ESSENTIAL HYPERTENSION: Primary | ICD-10-CM

## 2019-09-18 DIAGNOSIS — Z91.09 ENVIRONMENTAL ALLERGIES: ICD-10-CM

## 2019-09-18 DIAGNOSIS — E78.5 HYPERLIPIDEMIA, UNSPECIFIED HYPERLIPIDEMIA TYPE: ICD-10-CM

## 2019-09-18 DIAGNOSIS — E55.9 VITAMIN D DEFICIENCY: ICD-10-CM

## 2019-09-18 LAB
CHOLEST SERPL-MCNC: 128 MG/DL (ref 0–200)
HDLC SERPL-MCNC: 43 MG/DL (ref 40–60)
LDLC SERPL CALC-MCNC: 70 MG/DL (ref 0–100)
LDLC/HDLC SERPL: 1.64 {RATIO}
TRIGL SERPL-MCNC: 73 MG/DL (ref 0–150)
VLDLC SERPL-MCNC: 14.6 MG/DL (ref 5–40)

## 2019-09-18 PROCEDURE — 99214 OFFICE O/P EST MOD 30 MIN: CPT | Performed by: NURSE PRACTITIONER

## 2019-09-18 NOTE — PROGRESS NOTES
Will you please call Bentley and let him know that his lipid panel looks great.  Keep up the good work.  Continue medications, diet and exercise.   Thanks

## 2019-09-18 NOTE — PROGRESS NOTES
Subjective   Bentley Monaco is a 44 y.o. male.     Chief Complaint: Follow-up and Hypertension    History of Present Illness   Hypertension   This is a chronic problem. The current episode started more than 1 year ago. The problem is unchanged. The problem is controlled. Associated symptoms include malaise/fatigue. Pertinent negatives include no chest pain, palpitations, peripheral edema or shortness of breath. There are no associated agents to hypertension. Risk factors for coronary artery disease include dyslipidemia, male gender, obesity and sedentary lifestyle (Father has a history of atrial fib). Current antihypertension treatment includes beta blockers and ACE inhibitors. The current treatment provides significant improvement. Compliance problems include exercise and diet.    Hyperlipidemia   This is a chronic (Patient states that his father has a history of atrial fib and he was placed on a statin due to his family history of heart disease) problem. The current episode started more than 1 year ago. Recent lipid tests were reviewed and are variable. Exacerbating diseases include obesity. Factors aggravating his hyperlipidemia include beta blockers. Pertinent negatives include no chest pain or shortness of breath. Current antihyperlipidemic treatment includes statins. The current treatment provides significant improvement of lipids. Compliance problems include adherence to exercise and adherence to diet.  Risk factors for coronary artery disease include dyslipidemia, male sex, obesity and a sedentary lifestyle.   Seizures    This is a chronic (Patient states that he had his first seizure in 2017.  It appears that he has had a total of 2 or 3 seizures in the past.  He is being followed by neurology and is currently taking Keppra.  He denies any further seizures since 2017) problem. The current episode started more than 1 year ago. The problem has been resolved. Pertinent negatives include no chest pain. The episode  was witnessed. There was the sensation of an aura present.  He has had no further seizure activity.  URI    This is a chronic (Patient has a history of seasonal allergies.  He is currently using Flonase and taking Claritin daily.  He states that this time of year is the worst for his symptoms.  After the springtime he will only take his antihistamines as needed.) problem. Pertinent negatives include no chest pain.      Vitamin D deficiency  Patient does have a hx of Vit D def.  He is currently taking otc supplementation.     Family History   Problem Relation Age of Onset   • Prostate cancer Other    • Diabetes Mother    • Thyroid disease Father    • Hyperlipidemia Father    • Atrial fibrillation Father    • Diabetes Maternal Grandmother    • Anemia Paternal Grandmother        Social History     Socioeconomic History   • Marital status:      Spouse name: Not on file   • Number of children: Not on file   • Years of education: Not on file   • Highest education level: Not on file   Tobacco Use   • Smoking status: Former Smoker     Last attempt to quit: 2013     Years since quittin.3   • Smokeless tobacco: Never Used   Substance and Sexual Activity   • Alcohol use: No   • Drug use: No   • Sexual activity: Defer       Past Medical History:   Diagnosis Date   • Fatigue    • Hyperlipidemia    • Hypertension    • Hypogonadism in male    • Low testosterone    • Seizures (CMS/HCC)        Review of Systems   Constitutional: Negative.    HENT: Negative.    Respiratory: Negative.    Cardiovascular: Negative.    Gastrointestinal: Negative.    Musculoskeletal: Negative.    Skin: Negative.    Neurological: Negative.    Psychiatric/Behavioral: Negative.        Objective   Physical Exam   Constitutional: He is oriented to person, place, and time. He appears well-developed and well-nourished.   Neck: Normal range of motion. Neck supple.   Cardiovascular: Normal rate, regular rhythm and normal heart sounds.  "  Pulmonary/Chest: Effort normal and breath sounds normal.   Neurological: He is alert and oriented to person, place, and time.   Skin: Skin is warm and dry.   Psychiatric: He has a normal mood and affect. His behavior is normal. Judgment and thought content normal.   Nursing note and vitals reviewed.      Procedures    Vitals: Blood pressure 110/80, pulse 66, temperature 98.3 °F (36.8 °C), temperature source Oral, height 170.2 cm (67\"), weight 95.7 kg (211 lb), SpO2 99 %.    Allergies:   Allergies   Allergen Reactions   • Fish-Derived Products      Scallops and mackeral   • Other      turnips        During this visit the following were done:  Labs Reviewed []    Labs Ordered []    Radiology Reports Reviewed []    Radiology Ordered []    PCP Records Reviewed []    Referring Provider Records Reviewed []    ER Records Reviewed []    Hospital Records Reviewed []    History Obtained From Family []    Radiology Images Reviewed []    Other Reviewed []    Records Requested []      Assessment/Plan   Bentley was seen today for follow-up and hypertension.    Diagnoses and all orders for this visit:    Essential hypertension   Continue lisinopril and metoprolol  Hyperlipidemia, unspecified hyperlipidemia type  -     Lipid Panel; Future   Continue pravastatin  Environmental allergies   Continue flonase  Vitamin D deficiency   Continue vit d otc             "

## 2019-09-18 NOTE — TELEPHONE ENCOUNTER
----- Message from EVERETT Acuna sent at 9/18/2019 11:46 AM EDT -----  Will you please call Bentley and let him know that his lipid panel looks great.  Keep up the good work.  Continue medications, diet and exercise.   Thanks        Patient notified & verbalized understanding.

## 2020-02-12 DIAGNOSIS — E78.5 HYPERLIPIDEMIA, UNSPECIFIED HYPERLIPIDEMIA TYPE: ICD-10-CM

## 2020-02-12 DIAGNOSIS — E55.9 VITAMIN D DEFICIENCY: ICD-10-CM

## 2020-02-12 DIAGNOSIS — Z86.69 HX OF SEIZURE DISORDER: ICD-10-CM

## 2020-02-12 DIAGNOSIS — Z91.09 ENVIRONMENTAL ALLERGIES: ICD-10-CM

## 2020-02-12 DIAGNOSIS — I10 ESSENTIAL HYPERTENSION: Primary | ICD-10-CM

## 2020-02-12 DIAGNOSIS — E34.9 TESTOSTERONE DEFICIENCY: ICD-10-CM

## 2020-02-18 ENCOUNTER — LAB (OUTPATIENT)
Dept: FAMILY MEDICINE CLINIC | Facility: CLINIC | Age: 45
End: 2020-02-18

## 2020-02-18 DIAGNOSIS — Z86.69 HX OF SEIZURE DISORDER: ICD-10-CM

## 2020-02-18 DIAGNOSIS — Z91.09 ENVIRONMENTAL ALLERGIES: ICD-10-CM

## 2020-02-18 DIAGNOSIS — E78.5 HYPERLIPIDEMIA, UNSPECIFIED HYPERLIPIDEMIA TYPE: ICD-10-CM

## 2020-02-18 DIAGNOSIS — E34.9 TESTOSTERONE DEFICIENCY: ICD-10-CM

## 2020-02-18 DIAGNOSIS — I10 ESSENTIAL HYPERTENSION: ICD-10-CM

## 2020-02-18 DIAGNOSIS — E55.9 VITAMIN D DEFICIENCY: ICD-10-CM

## 2020-02-18 PROCEDURE — 84402 ASSAY OF FREE TESTOSTERONE: CPT | Performed by: NURSE PRACTITIONER

## 2020-02-18 PROCEDURE — 85025 COMPLETE CBC W/AUTO DIFF WBC: CPT | Performed by: NURSE PRACTITIONER

## 2020-02-18 PROCEDURE — 84403 ASSAY OF TOTAL TESTOSTERONE: CPT | Performed by: NURSE PRACTITIONER

## 2020-02-18 PROCEDURE — 83735 ASSAY OF MAGNESIUM: CPT | Performed by: NURSE PRACTITIONER

## 2020-02-18 PROCEDURE — 80061 LIPID PANEL: CPT | Performed by: NURSE PRACTITIONER

## 2020-02-18 PROCEDURE — 84439 ASSAY OF FREE THYROXINE: CPT | Performed by: NURSE PRACTITIONER

## 2020-02-18 PROCEDURE — 82306 VITAMIN D 25 HYDROXY: CPT | Performed by: NURSE PRACTITIONER

## 2020-02-18 PROCEDURE — 84443 ASSAY THYROID STIM HORMONE: CPT | Performed by: NURSE PRACTITIONER

## 2020-02-18 PROCEDURE — 80053 COMPREHEN METABOLIC PANEL: CPT | Performed by: NURSE PRACTITIONER

## 2020-02-18 PROCEDURE — 82607 VITAMIN B-12: CPT | Performed by: NURSE PRACTITIONER

## 2020-02-19 LAB
25(OH)D3 SERPL-MCNC: 25.3 NG/ML (ref 30–100)
ALBUMIN SERPL-MCNC: 4.4 G/DL (ref 3.5–5.2)
ALBUMIN/GLOB SERPL: 1.8 G/DL
ALP SERPL-CCNC: 70 U/L (ref 39–117)
ALT SERPL W P-5'-P-CCNC: 47 U/L (ref 1–41)
ANION GAP SERPL CALCULATED.3IONS-SCNC: 11.4 MMOL/L (ref 5–15)
AST SERPL-CCNC: 26 U/L (ref 1–40)
BASOPHILS # BLD AUTO: 0.06 10*3/MM3 (ref 0–0.2)
BASOPHILS NFR BLD AUTO: 1 % (ref 0–1.5)
BILIRUB SERPL-MCNC: 0.3 MG/DL (ref 0.2–1.2)
BUN BLD-MCNC: 19 MG/DL (ref 6–20)
BUN/CREAT SERPL: 19.6 (ref 7–25)
CALCIUM SPEC-SCNC: 10.1 MG/DL (ref 8.6–10.5)
CHLORIDE SERPL-SCNC: 105 MMOL/L (ref 98–107)
CHOLEST SERPL-MCNC: 164 MG/DL (ref 0–200)
CO2 SERPL-SCNC: 22.6 MMOL/L (ref 22–29)
CREAT BLD-MCNC: 0.97 MG/DL (ref 0.76–1.27)
DEPRECATED RDW RBC AUTO: 39 FL (ref 37–54)
EOSINOPHIL # BLD AUTO: 0.22 10*3/MM3 (ref 0–0.4)
EOSINOPHIL NFR BLD AUTO: 3.6 % (ref 0.3–6.2)
ERYTHROCYTE [DISTWIDTH] IN BLOOD BY AUTOMATED COUNT: 12.7 % (ref 12.3–15.4)
GFR SERPL CREATININE-BSD FRML MDRD: 84 ML/MIN/1.73
GLOBULIN UR ELPH-MCNC: 2.5 GM/DL
GLUCOSE BLD-MCNC: 92 MG/DL (ref 65–99)
HCT VFR BLD AUTO: 47.9 % (ref 37.5–51)
HDLC SERPL-MCNC: 49 MG/DL (ref 40–60)
HGB BLD-MCNC: 16 G/DL (ref 13–17.7)
IMM GRANULOCYTES # BLD AUTO: 0.01 10*3/MM3 (ref 0–0.05)
IMM GRANULOCYTES NFR BLD AUTO: 0.2 % (ref 0–0.5)
LDLC SERPL CALC-MCNC: 80 MG/DL (ref 0–100)
LDLC/HDLC SERPL: 1.64 {RATIO}
LYMPHOCYTES # BLD AUTO: 2.01 10*3/MM3 (ref 0.7–3.1)
LYMPHOCYTES NFR BLD AUTO: 33.3 % (ref 19.6–45.3)
MAGNESIUM SERPL-MCNC: 2.2 MG/DL (ref 1.6–2.6)
MCH RBC QN AUTO: 28.1 PG (ref 26.6–33)
MCHC RBC AUTO-ENTMCNC: 33.4 G/DL (ref 31.5–35.7)
MCV RBC AUTO: 84.2 FL (ref 79–97)
MONOCYTES # BLD AUTO: 0.71 10*3/MM3 (ref 0.1–0.9)
MONOCYTES NFR BLD AUTO: 11.8 % (ref 5–12)
NEUTROPHILS # BLD AUTO: 3.02 10*3/MM3 (ref 1.7–7)
NEUTROPHILS NFR BLD AUTO: 50.1 % (ref 42.7–76)
NRBC BLD AUTO-RTO: 0 /100 WBC (ref 0–0.2)
PLATELET # BLD AUTO: 199 10*3/MM3 (ref 140–450)
PMV BLD AUTO: 11.6 FL (ref 6–12)
POTASSIUM BLD-SCNC: 4.9 MMOL/L (ref 3.5–5.2)
PROT SERPL-MCNC: 6.9 G/DL (ref 6–8.5)
RBC # BLD AUTO: 5.69 10*6/MM3 (ref 4.14–5.8)
SODIUM BLD-SCNC: 139 MMOL/L (ref 136–145)
T4 FREE SERPL-MCNC: 1.1 NG/DL (ref 0.93–1.7)
TESTOST FREE SERPL-MCNC: 8 PG/ML (ref 6.8–21.5)
TESTOST SERPL-MCNC: 321 NG/DL (ref 264–916)
TRIGL SERPL-MCNC: 173 MG/DL (ref 0–150)
TSH SERPL DL<=0.05 MIU/L-ACNC: 1.42 UIU/ML (ref 0.27–4.2)
VIT B12 BLD-MCNC: 612 PG/ML (ref 211–946)
VLDLC SERPL-MCNC: 34.6 MG/DL (ref 5–40)
WBC NRBC COR # BLD: 6.03 10*3/MM3 (ref 3.4–10.8)

## 2020-02-20 ENCOUNTER — TELEPHONE (OUTPATIENT)
Dept: FAMILY MEDICINE CLINIC | Facility: CLINIC | Age: 45
End: 2020-02-20

## 2020-02-20 NOTE — PROGRESS NOTES
His labs look good with the exception of his Vit D.  Its a little low.    He may be taking otc Vit D.  Please let him know.

## 2020-02-20 NOTE — TELEPHONE ENCOUNTER
----- Message from EVERETT Acuna sent at 2/20/2020  9:26 AM EST -----  His labs look good with the exception of his Vit D.  Its a little low.    He may be taking otc Vit D.  Please let him know.     Patient notified and states he is taking vitamin d and will continue.

## 2020-03-16 ENCOUNTER — TELEPHONE (OUTPATIENT)
Dept: FAMILY MEDICINE CLINIC | Facility: CLINIC | Age: 45
End: 2020-03-16

## 2020-03-16 DIAGNOSIS — Z91.09 ENVIRONMENTAL ALLERGIES: ICD-10-CM

## 2020-03-16 DIAGNOSIS — I10 ESSENTIAL HYPERTENSION: ICD-10-CM

## 2020-03-16 DIAGNOSIS — E78.5 HYPERLIPIDEMIA, UNSPECIFIED HYPERLIPIDEMIA TYPE: ICD-10-CM

## 2020-03-16 RX ORDER — FLUTICASONE PROPIONATE 50 MCG
2 SPRAY, SUSPENSION (ML) NASAL DAILY
Qty: 48 G | Refills: 3 | Status: SHIPPED | OUTPATIENT
Start: 2020-03-16 | End: 2020-08-12 | Stop reason: SDUPTHER

## 2020-03-16 RX ORDER — PRAVASTATIN SODIUM 40 MG
40 TABLET ORAL NIGHTLY
Qty: 90 TABLET | Refills: 1 | Status: SHIPPED | OUTPATIENT
Start: 2020-03-16 | End: 2020-08-12 | Stop reason: SDUPTHER

## 2020-03-16 RX ORDER — LISINOPRIL 10 MG/1
5 TABLET ORAL DAILY
Qty: 90 TABLET | Refills: 1 | Status: SHIPPED | OUTPATIENT
Start: 2020-03-16 | End: 2020-08-12

## 2020-03-16 RX ORDER — LEVETIRACETAM 500 MG/1
500 TABLET ORAL 2 TIMES DAILY
Qty: 180 TABLET | Refills: 1 | Status: SHIPPED | OUTPATIENT
Start: 2020-03-16 | End: 2020-08-12

## 2020-08-10 ENCOUNTER — LAB (OUTPATIENT)
Dept: LAB | Facility: HOSPITAL | Age: 45
End: 2020-08-10

## 2020-08-10 DIAGNOSIS — E78.5 HYPERLIPIDEMIA, UNSPECIFIED HYPERLIPIDEMIA TYPE: ICD-10-CM

## 2020-08-10 DIAGNOSIS — I10 ESSENTIAL HYPERTENSION: ICD-10-CM

## 2020-08-10 DIAGNOSIS — E55.9 VITAMIN D DEFICIENCY: ICD-10-CM

## 2020-08-10 DIAGNOSIS — I10 ESSENTIAL HYPERTENSION: Primary | ICD-10-CM

## 2020-08-10 LAB
25(OH)D3 SERPL-MCNC: 65.3 NG/ML (ref 30–100)
ALBUMIN SERPL-MCNC: 4.7 G/DL (ref 3.5–5.2)
ALBUMIN/GLOB SERPL: 2.1 G/DL
ALP SERPL-CCNC: 57 U/L (ref 39–117)
ALT SERPL W P-5'-P-CCNC: 30 U/L (ref 1–41)
ANION GAP SERPL CALCULATED.3IONS-SCNC: 7.5 MMOL/L (ref 5–15)
AST SERPL-CCNC: 22 U/L (ref 1–40)
BILIRUB SERPL-MCNC: 0.3 MG/DL (ref 0–1.2)
BUN SERPL-MCNC: 12 MG/DL (ref 6–20)
BUN/CREAT SERPL: 9.8 (ref 7–25)
CALCIUM SPEC-SCNC: 9.9 MG/DL (ref 8.6–10.5)
CHLORIDE SERPL-SCNC: 98 MMOL/L (ref 98–107)
CHOLEST SERPL-MCNC: 165 MG/DL (ref 0–200)
CO2 SERPL-SCNC: 26.5 MMOL/L (ref 22–29)
CREAT SERPL-MCNC: 1.23 MG/DL (ref 0.76–1.27)
GFR SERPL CREATININE-BSD FRML MDRD: 64 ML/MIN/1.73
GLOBULIN UR ELPH-MCNC: 2.2 GM/DL
GLUCOSE SERPL-MCNC: 102 MG/DL (ref 65–99)
HDLC SERPL-MCNC: 51 MG/DL (ref 40–60)
LDLC SERPL CALC-MCNC: 96 MG/DL (ref 0–100)
LDLC/HDLC SERPL: 1.88 {RATIO}
POTASSIUM SERPL-SCNC: 4.5 MMOL/L (ref 3.5–5.2)
PROT SERPL-MCNC: 6.9 G/DL (ref 6–8.5)
SODIUM SERPL-SCNC: 132 MMOL/L (ref 136–145)
TRIGL SERPL-MCNC: 91 MG/DL (ref 0–150)
VLDLC SERPL-MCNC: 18.2 MG/DL (ref 5–40)

## 2020-08-10 PROCEDURE — 82306 VITAMIN D 25 HYDROXY: CPT

## 2020-08-10 PROCEDURE — 80053 COMPREHEN METABOLIC PANEL: CPT

## 2020-08-10 PROCEDURE — 80061 LIPID PANEL: CPT

## 2020-08-12 ENCOUNTER — OFFICE VISIT (OUTPATIENT)
Dept: FAMILY MEDICINE CLINIC | Facility: CLINIC | Age: 45
End: 2020-08-12

## 2020-08-12 VITALS
HEIGHT: 67 IN | BODY MASS INDEX: 36.76 KG/M2 | OXYGEN SATURATION: 97 % | SYSTOLIC BLOOD PRESSURE: 130 MMHG | DIASTOLIC BLOOD PRESSURE: 82 MMHG | WEIGHT: 234.2 LBS | RESPIRATION RATE: 12 BRPM | TEMPERATURE: 97.6 F | HEART RATE: 85 BPM

## 2020-08-12 DIAGNOSIS — R06.83 SNORING: ICD-10-CM

## 2020-08-12 DIAGNOSIS — Z91.09 ENVIRONMENTAL ALLERGIES: ICD-10-CM

## 2020-08-12 DIAGNOSIS — I10 ESSENTIAL HYPERTENSION: ICD-10-CM

## 2020-08-12 DIAGNOSIS — R53.83 OTHER FATIGUE: Primary | ICD-10-CM

## 2020-08-12 DIAGNOSIS — E78.5 HYPERLIPIDEMIA, UNSPECIFIED HYPERLIPIDEMIA TYPE: ICD-10-CM

## 2020-08-12 PROCEDURE — 99214 OFFICE O/P EST MOD 30 MIN: CPT | Performed by: NURSE PRACTITIONER

## 2020-08-12 RX ORDER — CETIRIZINE HYDROCHLORIDE 10 MG/1
10 TABLET ORAL DAILY
COMMUNITY

## 2020-08-12 RX ORDER — FLUTICASONE PROPIONATE 50 MCG
2 SPRAY, SUSPENSION (ML) NASAL DAILY
Qty: 48 G | Refills: 11 | Status: SHIPPED | OUTPATIENT
Start: 2020-08-12

## 2020-08-12 RX ORDER — PRAVASTATIN SODIUM 40 MG
40 TABLET ORAL NIGHTLY
Qty: 90 TABLET | Refills: 3 | Status: SHIPPED | OUTPATIENT
Start: 2020-08-12 | End: 2021-06-16

## 2020-08-12 NOTE — PROGRESS NOTES
Subjective   Bentley Monaco is a 45 y.o. male.     Chief Complaint: Hypertension ( ) and Results ( )    Hypertension   This is a chronic problem. The current episode started more than 1 year ago. The problem is controlled. There are no associated agents to hypertension. Current antihypertension treatment includes ACE inhibitors and beta blockers (Patient states that he has been checking his blood pressure at home and getting 120s systolic.  He has requested to stop taking lisinopril.).   Hyperlipidemia   This is a chronic problem. The current episode started more than 1 year ago. The problem is controlled. Exacerbating diseases include obesity. Factors aggravating his hyperlipidemia include fatty foods. Current antihyperlipidemic treatment includes statins. The current treatment provides significant improvement of lipids. Compliance problems include adherence to exercise and adherence to diet.    Fatigue   This is a recurrent problem. The current episode started more than 1 month ago. The problem occurs daily. The problem has been unchanged. Associated symptoms include fatigue. Associated symptoms comments: Concentration difficulties, daytime sleepiness, loud snoring at night. Nothing aggravates the symptoms. He has tried nothing for the symptoms.      Discussed recent labs today    Family History   Problem Relation Age of Onset   • Prostate cancer Other    • Diabetes Mother    • Thyroid disease Father    • Hyperlipidemia Father    • Atrial fibrillation Father    • Diabetes Maternal Grandmother    • Anemia Paternal Grandmother        Social History     Socioeconomic History   • Marital status:      Spouse name: Not on file   • Number of children: Not on file   • Years of education: Not on file   • Highest education level: Not on file   Tobacco Use   • Smoking status: Former Smoker     Last attempt to quit: 2013     Years since quittin.2   • Smokeless tobacco: Never Used   Substance and Sexual Activity  "  • Alcohol use: No   • Drug use: No   • Sexual activity: Defer       Past Medical History:   Diagnosis Date   • Fatigue    • Hyperlipidemia    • Hypertension    • Hypogonadism in male    • Low testosterone    • Seizures (CMS/HCC)     10/24/2019 last seizure       Review of Systems   Constitutional: Positive for fatigue.   HENT: Negative.    Respiratory: Negative.    Cardiovascular: Negative.    Gastrointestinal: Negative.    Musculoskeletal: Negative.    Skin: Negative.    Neurological: Negative.    Psychiatric/Behavioral: Negative.        Objective   Physical Exam   Constitutional: He is oriented to person, place, and time. He appears well-developed and well-nourished.   Neck: Normal range of motion. Neck supple.   Cardiovascular: Normal rate, regular rhythm and normal heart sounds.   Pulmonary/Chest: Effort normal and breath sounds normal.   Neurological: He is alert and oriented to person, place, and time.   Skin: Skin is warm and dry.   Psychiatric: He has a normal mood and affect. His behavior is normal. Judgment and thought content normal.   Nursing note and vitals reviewed.      Procedures    Vitals: Blood pressure 130/82, pulse 85, temperature 97.6 °F (36.4 °C), temperature source Temporal, resp. rate 12, height 170.2 cm (67.01\"), weight 106 kg (234 lb 3.2 oz), SpO2 97 %.    Body mass index is 36.67 kg/m².     Allergies:   Allergies   Allergen Reactions   • Fish-Derived Products GI Intolerance     Scallops and mackeral   • Other Unknown - Low Severity     turnips        During this visit the following were done:  Labs Reviewed []    Labs Ordered []    Radiology Reports Reviewed []    Radiology Ordered []    PCP Records Reviewed []    Referring Provider Records Reviewed []    ER Records Reviewed []    Hospital Records Reviewed []    History Obtained From Family []    Radiology Images Reviewed []    Other Reviewed []    Records Requested []      Assessment/Plan   Bentley was seen today for hypertension and " results.    Diagnoses and all orders for this visit:    Other fatigue  -     Home Sleep Study; Future    Environmental allergies  -     fluticasone (FLONASE) 50 MCG/ACT nasal spray; 2 sprays into the nostril(s) as directed by provider Daily.    Essential hypertension  -     metoprolol tartrate (LOPRESSOR) 25 MG tablet; Take 1 tablet by mouth Daily.    Hyperlipidemia, unspecified hyperlipidemia type  -     pravastatin (PRAVACHOL) 40 MG tablet; Take 1 tablet by mouth Every Night.    Snoring  -     Home Sleep Study; Future      I have advised patient today that he may stop lisinopril 5 mg daily at this time.  He is to continue taking his metoprolol.  He is also to continue watching his blood pressure at home.  He is to report to the office if his blood pressure elevates above 130 systolic.  We will go ahead and order home sleep study for patient as this could be an issue for him with his symptoms of fatigue and loud snoring.  He also has a large neck which would indicate that he may have sleep apnea.

## 2021-03-18 ENCOUNTER — BULK ORDERING (OUTPATIENT)
Dept: CASE MANAGEMENT | Facility: OTHER | Age: 46
End: 2021-03-18

## 2021-03-18 DIAGNOSIS — Z23 IMMUNIZATION DUE: ICD-10-CM

## 2021-03-18 RX ORDER — LISINOPRIL 10 MG/1
TABLET ORAL
Qty: 90 TABLET | Refills: 3 | Status: SHIPPED | OUTPATIENT
Start: 2021-03-18

## 2021-06-16 DIAGNOSIS — E78.5 HYPERLIPIDEMIA, UNSPECIFIED HYPERLIPIDEMIA TYPE: ICD-10-CM

## 2021-06-16 RX ORDER — PRAVASTATIN SODIUM 40 MG
TABLET ORAL
Qty: 90 TABLET | Refills: 3 | Status: SHIPPED | OUTPATIENT
Start: 2021-06-16

## 2021-06-16 NOTE — TELEPHONE ENCOUNTER
Will send med.  Needs a follow up maybe a yearly exam august       Left a message to return call.    Left a second message to return call.      Still not available,letter mailed.

## 2022-02-02 ENCOUNTER — OFFICE VISIT (OUTPATIENT)
Dept: UROLOGY | Facility: CLINIC | Age: 47
End: 2022-02-02

## 2022-02-02 VITALS — BODY MASS INDEX: 36.19 KG/M2 | HEIGHT: 67 IN | WEIGHT: 230.6 LBS

## 2022-02-02 DIAGNOSIS — E29.1 HYPOGONADISM IN MALE: Primary | ICD-10-CM

## 2022-02-02 PROCEDURE — 84403 ASSAY OF TOTAL TESTOSTERONE: CPT | Performed by: UROLOGY

## 2022-02-02 PROCEDURE — 83002 ASSAY OF GONADOTROPIN (LH): CPT | Performed by: UROLOGY

## 2022-02-02 PROCEDURE — 85014 HEMATOCRIT: CPT | Performed by: UROLOGY

## 2022-02-02 PROCEDURE — 84146 ASSAY OF PROLACTIN: CPT | Performed by: UROLOGY

## 2022-02-02 PROCEDURE — 99203 OFFICE O/P NEW LOW 30 MIN: CPT | Performed by: UROLOGY

## 2022-02-02 PROCEDURE — 85018 HEMOGLOBIN: CPT | Performed by: UROLOGY

## 2022-02-02 NOTE — PROGRESS NOTES
Low Testosterone Office Visit      Patient Name: Bentley Monaco  : 1975   MRN: [id number]     Chief Complaint: Low Testosterone.    Chief Complaint   Patient presents with   • Low Testosterone     New Patient    .     Referring Provider: Kennedy North MD    History of Present Illness: Mr. Monaco is a 46 y.o. male with history of low testosterone.  He reports he was on TRT before starting in approximately .  He reports he did well for some time and then his testosterone was holding steady and he felt better so he stopped.  He reports his last testosterone prescription was around the beginning of 2019.  HE was doing very well but reports his symptoms have returned.  His PMD was checking his T yearly.  His most recent testosterone from 21 is 241.  Free T of 5.  He has sleep apnea and has a CPAP machine which he uses regularly.      Of note, he has a seizure disorder.  He was cleared by a neurologist in the past for TRT.  His seizure disorder is due to a head injury in the past.    He also had polycythemia due to his last TRT.         The serum test was collected due to the following complaints: .    Low libido   yes  Low energy   yes  Poor sleep   no  Mental clarity issues  no  History of depression? no  Erectile dysfunction?  yes  Any potential interest in conception?  no      Subjective      Review of System: Review of Systems   Constitutional: Positive for fatigue. Negative for chills and fever.   HENT: Negative for congestion and sinus pressure.    Respiratory: Negative for chest tightness and shortness of breath.    Cardiovascular: Negative for chest pain.   Gastrointestinal: Negative for abdominal pain, constipation, diarrhea, nausea and vomiting.   Endocrine: Negative for heat intolerance.   Genitourinary: Negative for dysuria, flank pain, frequency, hematuria and urgency.   Musculoskeletal: Negative for back pain and neck pain.   Skin: Negative for rash.   Allergic/Immunologic: Negative  for food allergies.   Neurological: Negative for dizziness and headaches.   Hematological: Bruises/bleeds easily.   Psychiatric/Behavioral: The patient is not nervous/anxious.       I have reviewed the ROS documented by my clinical staff, I have updated appropriately and I agree. Dylan Restrepo MD    Past Medical History:  Past Medical History:   Diagnosis Date   • Fatigue    • Hyperlipidemia    • Hypertension    • Hypogonadism in male    • Low testosterone    • Seizures (HCC)     10/24/2019 last seizure       Past Surgical History:  Past Surgical History:   Procedure Laterality Date   • MOUTH SURGERY     • TONSILLECTOMY         Medications:    Current Outpatient Medications:   •  Brivaracetam (Briviact) 100 MG tablet, Take 1 tablet by mouth 2 (two) times a day., Disp: , Rfl:   •  cetirizine (zyrTEC) 10 MG tablet, Take 10 mg by mouth Daily., Disp: , Rfl:   •  fluticasone (FLONASE) 50 MCG/ACT nasal spray, 2 sprays into the nostril(s) as directed by provider Daily., Disp: 48 g, Rfl: 11  •  lisinopril (PRINIVIL,ZESTRIL) 10 MG tablet, TAKE 1/2 TABLET BY MOUTH DAILY, Disp: 90 tablet, Rfl: 3  •  metoprolol tartrate (LOPRESSOR) 25 MG tablet, Take 1 tablet by mouth Daily., Disp: 90 tablet, Rfl: 3  •  pravastatin (PRAVACHOL) 40 MG tablet, TAKE ONE TABLET BY MOUTH ONCE NIGHTLY, Disp: 90 tablet, Rfl: 3    Allergies:  Allergies   Allergen Reactions   • Fish-Derived Products GI Intolerance     Scallops and mackeral   • Other Unknown - Low Severity     turnips       Social History:  Social History     Socioeconomic History   • Marital status:    Tobacco Use   • Smoking status: Former Smoker     Quit date: 2013     Years since quittin.7   • Smokeless tobacco: Never Used   Substance and Sexual Activity   • Alcohol use: No   • Drug use: No   • Sexual activity: Defer       Family History:  Family History   Problem Relation Age of Onset   • Prostate cancer Other    • Diabetes Mother    • Thyroid disease Father    •  "Hyperlipidemia Father    • Atrial fibrillation Father    • Diabetes Maternal Grandmother    • Anemia Paternal Grandmother        Objective     Physical Exam:   Vital Signs:   Vitals:    02/02/22 1059   Weight: 105 kg (230 lb 9.6 oz)   Height: 170.2 cm (67\")     Body mass index is 36.12 kg/m².     Physical Exam  Vitals and nursing note reviewed.   Constitutional:       General: He is awake. He is not in acute distress.     Appearance: Normal appearance. He is well-developed. He is obese.   HENT:      Head: Normocephalic and atraumatic.      Right Ear: External ear normal.      Left Ear: External ear normal.      Nose: Nose normal.   Eyes:      Conjunctiva/sclera: Conjunctivae normal.   Pulmonary:      Effort: Pulmonary effort is normal.   Abdominal:      General: There is no distension.      Palpations: Abdomen is soft. There is no mass.      Tenderness: There is no abdominal tenderness. There is no right CVA tenderness, left CVA tenderness, guarding or rebound.      Hernia: No hernia is present. There is no hernia in the left inguinal area or right inguinal area.   Genitourinary:     Pubic Area: No rash.       Penis: Normal.       Testes: Normal.      Prostate: Normal.      Rectum: Normal. No mass or tenderness. Normal anal tone.      Comments: Prostate approx 25 g no nodules   Musculoskeletal:      Cervical back: Normal range of motion.   Lymphadenopathy:      Lower Body: No right inguinal adenopathy. No left inguinal adenopathy.   Skin:     General: Skin is warm.   Neurological:      General: No focal deficit present.      Mental Status: He is alert and oriented to person, place, and time.   Psychiatric:         Behavior: Behavior normal. Behavior is cooperative.         Labs:   Lab Results   Component Value Date    TESTOSTERONE 285.00 09/17/2019       No results found for: PSA    Lab Results   Component Value Date    WBC 6.03 02/18/2020    HGB 16.0 02/18/2020    HCT 47.9 02/18/2020    MCV 84.2 02/18/2020    PLT " 199 02/18/2020       No results found for: PSA    Images:   No Images in the past 120 days found..    Measures:   Tobacco:   Bentley Monaco  reports that he quit smoking about 8 years ago. He has never used smokeless tobacco..    Urine Incontinence: Patient reports that he is not currently experiencing any symptoms of urinary incontinence.      Assessment / Plan      Assessment/Plan:   Mr. Monaco is a 46 y.o. male who presented today with low testosterone.  We will confirm his lab work today.  I will have him follow-up in 2 weeks for testosterone replacement and injection teaching.       Diagnoses and all orders for this visit:    1. Hypogonadism in male (Primary)  -     Hemoglobin & Hematocrit, Blood  -     Luteinizing Hormone  -     Prolactin  -     Testosterone         Patient Education:   We discussed today the role testosterone plays for a male patient. I have indicated that low testosterone can be associated with metabolic syndrome, erectile dysfunction, coronary artery disease, diabetes, depression, osteoporosis, fatigue, low sex drive, poor sleep, high cholesterol, increased abdominal fat, muscle loss, irritability, hot flashes, and inability to concentrate.     Treatment options were discussed including SERMs, aromatase inhibitors, topical gel or patch, oral troches, nasal spray, testosterone injections every 2-3 weeks, long-acting injections every 10 weeks, and testosterone pellets placed in clinic every 4-6 months.    I explained the risks, benefits, and alternatives to testosterone therapies. I informed him of the potential SEs of chest and leg swelling, increased acne, change in mood, polycythemia, and worsening of undiagnosed prostate cancer.     Follow Up:   Return in about 2 weeks (around 2/16/2022) for Recheck.    I spent approximately 30 minutes providing clinical care for this patient; including review of patient's chart and provider documentation, face to face time spent with patient in examination  room (obtaining history, performing physical exam, discussing diagnosis and management options), placing orders, and completing patient documentation.     Dylan Restrepo MD  [facility] [place]

## 2022-02-03 LAB
HCT VFR BLD AUTO: 49.3 % (ref 37.5–51)
HGB BLD-MCNC: 16.8 G/DL (ref 13–17.7)
LH SERPL-ACNC: 2.67 MIU/ML
PROLACTIN SERPL-MCNC: 6.75 NG/ML (ref 4.04–15.2)
TESTOST SERPL-MCNC: 241 NG/DL (ref 249–836)

## 2022-02-14 ENCOUNTER — OFFICE VISIT (OUTPATIENT)
Dept: UROLOGY | Facility: CLINIC | Age: 47
End: 2022-02-14

## 2022-02-14 VITALS — BODY MASS INDEX: 36.26 KG/M2 | WEIGHT: 231 LBS | HEIGHT: 67 IN

## 2022-02-14 DIAGNOSIS — E29.1 HYPOGONADISM IN MALE: Primary | ICD-10-CM

## 2022-02-14 PROCEDURE — 99212 OFFICE O/P EST SF 10 MIN: CPT | Performed by: UROLOGY

## 2022-02-14 RX ORDER — TESTOSTERONE CYPIONATE 100 MG/ML
0.5 VIAL (ML) INTRAMUSCULAR 2 TIMES WEEKLY
Qty: 10 ML | Refills: 2 | Status: SHIPPED | OUTPATIENT
Start: 2022-02-14 | End: 2022-09-04 | Stop reason: SDUPTHER

## 2022-02-14 NOTE — PROGRESS NOTES
Follow Up Office Visit      Patient Name: Bentley Monaco  : 1975   MRN: [id number]     Chief Complaint:    Chief Complaint   Patient presents with   • Low Testosterone     follow up        Referring Provider: No ref. provider found    History of Present Illness: Bentley Monaco is a 46 y.o. male who presents today for follow up of  His hypogonadism.  His T was found .  He was previously doing 1 injection per week.    He does not remember dosages.  He did have polycythemia wth his last treatment.  He is eager to start his testosterone.  He continues to have fatigue and low libido.  He states that previously he had to donate blood.  He also has a seizure disorder but was told by his neurologist that testosterone replacement therapy would not affect his seizures.      Here today for teaching.     Subjective      Review of System: Review of Systems   Constitutional: Negative for chills, fatigue, fever and unexpected weight change.   HENT: Negative for congestion, rhinorrhea and sore throat.    Eyes: Negative for visual disturbance.   Respiratory: Negative for apnea, cough, chest tightness and shortness of breath.    Cardiovascular: Negative for chest pain.   Gastrointestinal: Negative for abdominal pain, constipation, diarrhea, nausea and vomiting.   Endocrine: Negative for polydipsia and polyuria.   Genitourinary: Negative for difficulty urinating, dysuria, enuresis, flank pain, frequency, genital sores, hematuria and urgency.   Musculoskeletal: Negative for gait problem.   Skin: Negative for rash and wound.   Allergic/Immunologic: Negative for immunocompromised state.   Neurological: Negative for dizziness, tremors, syncope, weakness and light-headedness.   Hematological: Does not bruise/bleed easily.      I have reviewed the ROS documented by my clinical staff, I have updated appropriately and I agree. Dylan Restrepo MD    I have reviewed and the following portions of the patient's history were updated as  appropriate: past family history, past medical history, past social history, past surgical history and problem list.    Past Medical History:   Past Medical History:   Diagnosis Date   • Fatigue    • Hyperlipidemia    • Hypertension    • Hypogonadism in male    • Low testosterone    • Seizures (HCC)     10/24/2019 last seizure       Past Surgical History:  Past Surgical History:   Procedure Laterality Date   • MOUTH SURGERY     • TONSILLECTOMY         Family History:   family history includes Anemia in his paternal grandmother; Atrial fibrillation in his father; Diabetes in his maternal grandmother and mother; Hyperlipidemia in his father; Prostate cancer in an other family member; Thyroid disease in his father.   Otherwise pertinent FHx was reviewed and not pertinent to current issue.    Social History:    reports that he quit smoking about 8 years ago. He has never used smokeless tobacco. He reports that he does not drink alcohol and does not use drugs.    Medications:     Current Outpatient Medications:   •  Brivaracetam (Briviact) 100 MG tablet, Take 1 tablet by mouth 2 (two) times a day., Disp: , Rfl:   •  cetirizine (zyrTEC) 10 MG tablet, Take 10 mg by mouth Daily., Disp: , Rfl:   •  fluticasone (FLONASE) 50 MCG/ACT nasal spray, 2 sprays into the nostril(s) as directed by provider Daily., Disp: 48 g, Rfl: 11  •  lisinopril (PRINIVIL,ZESTRIL) 10 MG tablet, TAKE 1/2 TABLET BY MOUTH DAILY, Disp: 90 tablet, Rfl: 3  •  metoprolol tartrate (LOPRESSOR) 25 MG tablet, Take 1 tablet by mouth Daily., Disp: 90 tablet, Rfl: 3  •  pravastatin (PRAVACHOL) 40 MG tablet, TAKE ONE TABLET BY MOUTH ONCE NIGHTLY, Disp: 90 tablet, Rfl: 3  •  Testosterone Cypionate 100 MG/ML solution, Inject 0.5 mL as directed 2 (Two) Times a Week., Disp: 10 mL, Rfl: 2    Allergies:   Allergies   Allergen Reactions   • Fish-Derived Products GI Intolerance     Scallops and mackeral   • Other Unknown - Low Severity     turnips       Objective  "    Physical Exam:   Vital Signs:   Vitals:    02/14/22 0918   Weight: 105 kg (231 lb)   Height: 170.2 cm (67\")     Body mass index is 36.18 kg/m².     Physical Exam  Vitals and nursing note reviewed.   Constitutional:       General: He is awake. He is not in acute distress.     Appearance: Normal appearance. He is well-developed. He is obese.   HENT:      Head: Normocephalic and atraumatic.      Right Ear: External ear normal.      Left Ear: External ear normal.      Nose: Nose normal.   Eyes:      Conjunctiva/sclera: Conjunctivae normal.   Pulmonary:      Effort: Pulmonary effort is normal.   Abdominal:      General: There is no distension.      Palpations: Abdomen is soft. There is no mass.      Tenderness: There is no abdominal tenderness. There is no right CVA tenderness, left CVA tenderness, guarding or rebound.      Hernia: No hernia is present. There is no hernia in the left inguinal area or right inguinal area.   Genitourinary:     Pubic Area: No rash.       Rectum: No mass or tenderness. Normal anal tone.   Musculoskeletal:      Cervical back: Normal range of motion.   Lymphadenopathy:      Lower Body: No right inguinal adenopathy. No left inguinal adenopathy.   Skin:     General: Skin is warm.   Neurological:      General: No focal deficit present.      Mental Status: He is alert and oriented to person, place, and time.   Psychiatric:         Behavior: Behavior normal. Behavior is cooperative.         Labs:   Brief Urine Lab Results     None               Lab Results   Component Value Date    GLUCOSE 102 (H) 08/10/2020    CALCIUM 9.9 08/10/2020     (L) 08/10/2020    K 4.5 08/10/2020    CO2 26.5 08/10/2020    CL 98 08/10/2020    BUN 12 08/10/2020    CREATININE 1.23 08/10/2020    EGFRIFNONA 64 08/10/2020    BCR 9.8 08/10/2020    ANIONGAP 7.5 08/10/2020       Lab Results   Component Value Date    WBC 6.03 02/18/2020    HGB 16.8 02/02/2022    HCT 49.3 02/02/2022    MCV 84.2 02/18/2020     " 02/18/2020       No results found for: PSA    Images:   No Images in the past 120 days found..      Measures:   Tobacco:   Bentley Monaco  reports that he quit smoking about 8 years ago. He has never used smokeless tobacco..    Urine Incontinence: Patient reports that he is not currently experiencing any symptoms of urinary incontinence.        Assessment / Plan      Assessment/Plan:   46 y.o. male who presented today for follow up of TRT.  We mary jo lstart 100 mg/week at 1/2 cc twice per week.  I again discussed the possible risks of testosterone replacement therapy with him today.  He has been on it previously and is eager to restart.  He was given teaching today by the nursing staff as well.  I will have him follow-up in 4 weeks for testosterone level as well as a hemoglobin and hematocrit.  Because of his age and testosterone replacement therapy we will obtain a PSA and NIKIA at his next visit as well.    Diagnoses and all orders for this visit:    1. Hypogonadism in male (Primary)  -     Testosterone Cypionate 100 MG/ML solution; Inject 0.5 mL as directed 2 (Two) Times a Week.  Dispense: 10 mL; Refill: 2           Follow Up:   Return in about 4 weeks (around 3/14/2022) for Follow up with labs.    I spent approximately 15 minutes providing clinical care for this patient; including review of patient's chart and provider documentation, face to face time spent with patient in examination room (obtaining history, performing physical exam, discussing diagnosis and management options), placing orders, and completing patient documentation.     Dylan Restrepo MD  [facility]

## 2022-03-14 ENCOUNTER — OFFICE VISIT (OUTPATIENT)
Dept: UROLOGY | Facility: CLINIC | Age: 47
End: 2022-03-14

## 2022-03-14 VITALS
DIASTOLIC BLOOD PRESSURE: 78 MMHG | WEIGHT: 231 LBS | HEIGHT: 67 IN | BODY MASS INDEX: 36.26 KG/M2 | SYSTOLIC BLOOD PRESSURE: 130 MMHG

## 2022-03-14 DIAGNOSIS — E29.1 HYPOGONADISM IN MALE: Primary | ICD-10-CM

## 2022-03-14 LAB
HCT VFR BLD AUTO: 50 % (ref 37.5–51)
HGB BLD-MCNC: 16.9 G/DL (ref 13–17.7)
PSA SERPL-MCNC: 0.77 NG/ML (ref 0–4)
TESTOST SERPL-MCNC: 473 NG/DL (ref 249–836)

## 2022-03-14 PROCEDURE — 99214 OFFICE O/P EST MOD 30 MIN: CPT | Performed by: UROLOGY

## 2022-03-14 PROCEDURE — 84153 ASSAY OF PSA TOTAL: CPT | Performed by: UROLOGY

## 2022-03-14 PROCEDURE — 85014 HEMATOCRIT: CPT | Performed by: UROLOGY

## 2022-03-14 PROCEDURE — 84403 ASSAY OF TOTAL TESTOSTERONE: CPT | Performed by: UROLOGY

## 2022-03-14 PROCEDURE — 85018 HEMOGLOBIN: CPT | Performed by: UROLOGY

## 2022-03-14 NOTE — PROGRESS NOTES
Low Testosterone Office Visit      Patient Name: Bentley Monaco  : 1975   MRN: [id number]     Chief Complaint: Low Testosterone.    Chief Complaint   Patient presents with   • Hypogonadism   .     Referring Provider: No ref. provider found    History of Present Illness: Mr. Monaco is a 46 y.o. male with history of low testosterone.  He is doing very well on his testosterone.  He reports his energy and libido is much better.  His sleep is much better.   No ED.  He reports no side effects from his injection.  No issues with injection sites.  He is very happy.  His last injection was Thursday and he is due tonight.         Subjective      Review of System: Review of Systems   Constitutional: Negative for chills, fatigue, fever and unexpected weight change.   HENT: Negative for congestion, rhinorrhea and sore throat.    Eyes: Negative for visual disturbance.   Respiratory: Negative for apnea, cough, chest tightness and shortness of breath.    Cardiovascular: Negative for chest pain.   Gastrointestinal: Negative for abdominal pain, constipation, diarrhea, nausea and vomiting.   Endocrine: Negative for polydipsia and polyuria.   Genitourinary: Negative for difficulty urinating, dysuria, enuresis, flank pain, frequency, genital sores, hematuria and urgency.   Musculoskeletal: Negative for gait problem.   Skin: Negative for rash and wound.   Allergic/Immunologic: Negative for immunocompromised state.   Neurological: Negative for dizziness, tremors, syncope, weakness and light-headedness.   Hematological: Does not bruise/bleed easily.      I have reviewed the ROS documented by my clinical staff, I have updated appropriately and I agree. Dylan Restrepo MD    Past Medical History:  Past Medical History:   Diagnosis Date   • Fatigue    • Hyperlipidemia    • Hypertension    • Hypogonadism in male    • Low testosterone    • Seizures (HCC)     10/24/2019 last seizure       Past Surgical History:  Past Surgical History:  "  Procedure Laterality Date   • MOUTH SURGERY     • TONSILLECTOMY         Medications:    Current Outpatient Medications:   •  Brivaracetam 100 MG tablet, Take 1 tablet by mouth 2 (two) times a day., Disp: , Rfl:   •  cetirizine (zyrTEC) 10 MG tablet, Take 10 mg by mouth Daily., Disp: , Rfl:   •  fluticasone (FLONASE) 50 MCG/ACT nasal spray, 2 sprays into the nostril(s) as directed by provider Daily., Disp: 48 g, Rfl: 11  •  lisinopril (PRINIVIL,ZESTRIL) 10 MG tablet, TAKE 1/2 TABLET BY MOUTH DAILY, Disp: 90 tablet, Rfl: 3  •  metoprolol tartrate (LOPRESSOR) 25 MG tablet, Take 1 tablet by mouth Daily., Disp: 90 tablet, Rfl: 3  •  pravastatin (PRAVACHOL) 40 MG tablet, TAKE ONE TABLET BY MOUTH ONCE NIGHTLY, Disp: 90 tablet, Rfl: 3  •  Testosterone Cypionate 100 MG/ML solution, Inject 0.5 mL as directed 2 (Two) Times a Week., Disp: 10 mL, Rfl: 2    Allergies:  Allergies   Allergen Reactions   • Fish-Derived Products GI Intolerance     Scallops and mackeral   • Other Unknown - Low Severity     turnips       Social History:  Social History     Socioeconomic History   • Marital status:    Tobacco Use   • Smoking status: Former Smoker     Quit date: 2013     Years since quittin.8   • Smokeless tobacco: Never Used   Substance and Sexual Activity   • Alcohol use: No   • Drug use: No   • Sexual activity: Defer       Family History:  Family History   Problem Relation Age of Onset   • Prostate cancer Other    • Diabetes Mother    • Thyroid disease Father    • Hyperlipidemia Father    • Atrial fibrillation Father    • Diabetes Maternal Grandmother    • Anemia Paternal Grandmother        Objective     Physical Exam:   Vital Signs:   Vitals:    22 0834   BP: 130/78   Weight: 105 kg (231 lb)   Height: 170.2 cm (67.01\")     Body mass index is 36.17 kg/m².     Physical Exam  Vitals and nursing note reviewed.   Constitutional:       General: He is awake. He is not in acute distress.     Appearance: Normal " appearance. He is well-developed. He is obese.   HENT:      Head: Normocephalic and atraumatic.      Right Ear: External ear normal.      Left Ear: External ear normal.      Nose: Nose normal.   Eyes:      Conjunctiva/sclera: Conjunctivae normal.   Pulmonary:      Effort: Pulmonary effort is normal.   Abdominal:      General: There is no distension.      Palpations: Abdomen is soft. There is no mass.      Tenderness: There is no abdominal tenderness. There is no right CVA tenderness, left CVA tenderness, guarding or rebound.      Hernia: No hernia is present. There is no hernia in the left inguinal area or right inguinal area.   Genitourinary:     Pubic Area: No rash.       Penis: Normal.       Testes: Normal.      Prostate: Normal.      Rectum: Normal. No mass or tenderness. Normal anal tone.   Musculoskeletal:      Cervical back: Normal range of motion.   Lymphadenopathy:      Lower Body: No right inguinal adenopathy. No left inguinal adenopathy.   Skin:     General: Skin is warm.   Neurological:      General: No focal deficit present.      Mental Status: He is alert and oriented to person, place, and time.   Psychiatric:         Behavior: Behavior normal. Behavior is cooperative.         Labs:   Lab Results   Component Value Date    TESTOSTERONE 241.00 (L) 02/02/2022       No results found for: PSA    Lab Results   Component Value Date    WBC 6.03 02/18/2020    HGB 16.8 02/02/2022    HCT 49.3 02/02/2022    MCV 84.2 02/18/2020     02/18/2020       No results found for: PSA    Images:   No Images in the past 120 days found..    Measures:   Tobacco:   Bentley Monaco  reports that he quit smoking about 8 years ago. He has never used smokeless tobacco.    Urine Incontinence: Patient reports that he is not currently experiencing any symptoms of urinary incontinence.       Assessment / Plan      Assessment/Plan:   Mr. Monaco is a 46 y.o. male who presented today with low testosterone.  He is doing very well and feels  much better on his testosterone.  I did discuss possible side effects.  He is doing well with his injections and not having any issues.  We will continue his current dosage I will recheck his testosterone today.  I will have him follow-up in 3 months.    Diagnoses and all orders for this visit:    1. Hypogonadism in male (Primary)  -     PSA DIAGNOSTIC  -     Testosterone  -     Hemoglobin & Hematocrit, Blood         Patient Education:   We discussed today the role testosterone plays for a male patient. I have indicated that low testosterone can be associated with metabolic syndrome, erectile dysfunction, coronary artery disease, diabetes, depression, osteoporosis, fatigue, low sex drive, poor sleep, high cholesterol, increased abdominal fat, muscle loss, irritability, hot flashes, and inability to concentrate.     Treatment options were discussed including SERMs, aromatase inhibitors, topical gel or patch, oral troches, nasal spray, testosterone injections every 2-3 weeks, long-acting injections every 10 weeks, and testosterone pellets placed in clinic every 4-6 months.    I explained the risks, benefits, and alternatives to testosterone therapies. I informed him of the potential SEs of chest and leg swelling, increased acne, change in mood, polycythemia, and worsening of undiagnosed prostate cancer.     Follow Up:   Return in about 3 months (around 6/14/2022).    I spent approximately 25 minutes providing clinical care for this patient; including review of patient's chart and provider documentation, face to face time spent with patient in examination room (obtaining history, performing physical exam, discussing diagnosis and management options), placing orders, and completing patient documentation.     Dylan Restrepo MD  [facility] [place] [facility] [place]

## 2022-06-17 ENCOUNTER — OFFICE VISIT (OUTPATIENT)
Dept: UROLOGY | Facility: CLINIC | Age: 47
End: 2022-06-17

## 2022-06-17 VITALS — BODY MASS INDEX: 36.1 KG/M2 | WEIGHT: 230 LBS | HEIGHT: 67 IN

## 2022-06-17 DIAGNOSIS — E29.1 HYPOGONADISM IN MALE: Primary | ICD-10-CM

## 2022-06-17 LAB
HCT VFR BLD AUTO: 53.5 % (ref 37.5–51)
HGB BLD-MCNC: 17.9 G/DL (ref 13–17.7)
TESTOST SERPL-MCNC: 274 NG/DL (ref 249–836)

## 2022-06-17 PROCEDURE — 85018 HEMOGLOBIN: CPT | Performed by: UROLOGY

## 2022-06-17 PROCEDURE — 85014 HEMATOCRIT: CPT | Performed by: UROLOGY

## 2022-06-17 PROCEDURE — 99213 OFFICE O/P EST LOW 20 MIN: CPT | Performed by: UROLOGY

## 2022-06-17 PROCEDURE — 84403 ASSAY OF TOTAL TESTOSTERONE: CPT | Performed by: UROLOGY

## 2022-06-17 NOTE — PROGRESS NOTES
Follow Up Office Visit      Patient Name: Bentley Monaco  : 1975   MRN: [id number]     Chief Complaint:    Chief Complaint   Patient presents with   • Low Testosterone      Follow up        Referring Provider: No ref. provider found    History of Present Illness: Bentley oMnaco is a 46 y.o. male who presents today for follow up of  Follow up for low T.  He reports he is doing excellent.  He states his energy level, libido, and fatigue have all improved dramatically.  He is very happy with his dosage.  He is on 100 mg/ week taking 0.5 mL twice weekly.   No LUTS.  No dysuria or gross hematuria.    He will be due for PSA testing next visit.      Subjective      Review of System: Review of Systems   Constitutional: Negative for chills, fatigue, fever and unexpected weight change.   HENT: Negative for congestion, rhinorrhea and sore throat.    Eyes: Negative for visual disturbance.   Respiratory: Negative for apnea, cough, chest tightness and shortness of breath.    Cardiovascular: Negative for chest pain.   Gastrointestinal: Negative for abdominal pain, constipation, diarrhea, nausea and vomiting.   Endocrine: Negative for polydipsia and polyuria.   Genitourinary: Negative for difficulty urinating, dysuria, enuresis, flank pain, frequency, genital sores, hematuria and urgency.   Musculoskeletal: Negative for gait problem.   Skin: Negative for rash and wound.   Allergic/Immunologic: Negative for immunocompromised state.   Neurological: Negative for dizziness, tremors, syncope, weakness and light-headedness.   Hematological: Does not bruise/bleed easily.      I have reviewed the ROS documented by my clinical staff, I have updated appropriately and I agree. Dylan Restrepo MD    I have reviewed and the following portions of the patient's history were updated as appropriate: past family history, past medical history, past social history, past surgical history and problem list.    Past Medical History:   Past Medical  "History:   Diagnosis Date   • Fatigue    • Hyperlipidemia    • Hypertension    • Hypogonadism in male    • Low testosterone    • Seizures (HCC)     10/24/2019 last seizure       Past Surgical History:  Past Surgical History:   Procedure Laterality Date   • MOUTH SURGERY     • TONSILLECTOMY         Family History:   family history includes Anemia in his paternal grandmother; Atrial fibrillation in his father; Diabetes in his maternal grandmother and mother; Hyperlipidemia in his father; Prostate cancer in an other family member; Thyroid disease in his father.   Otherwise pertinent FHx was reviewed and not pertinent to current issue.    Social History:    reports that he quit smoking about 9 years ago. He has never used smokeless tobacco. He reports that he does not drink alcohol and does not use drugs.    Medications:     Current Outpatient Medications:   •  Brivaracetam 100 MG tablet, Take 1 tablet by mouth 2 (two) times a day., Disp: , Rfl:   •  cetirizine (zyrTEC) 10 MG tablet, Take 10 mg by mouth Daily., Disp: , Rfl:   •  fluticasone (FLONASE) 50 MCG/ACT nasal spray, 2 sprays into the nostril(s) as directed by provider Daily., Disp: 48 g, Rfl: 11  •  lisinopril (PRINIVIL,ZESTRIL) 10 MG tablet, TAKE 1/2 TABLET BY MOUTH DAILY, Disp: 90 tablet, Rfl: 3  •  metoprolol tartrate (LOPRESSOR) 25 MG tablet, Take 1 tablet by mouth Daily., Disp: 90 tablet, Rfl: 3  •  pravastatin (PRAVACHOL) 40 MG tablet, TAKE ONE TABLET BY MOUTH ONCE NIGHTLY, Disp: 90 tablet, Rfl: 3  •  Testosterone Cypionate 100 MG/ML solution, Inject 0.5 mL as directed 2 (Two) Times a Week., Disp: 10 mL, Rfl: 2    Allergies:   Allergies   Allergen Reactions   • Fish-Derived Products GI Intolerance     Scallops and mackeral   • Other Unknown - Low Severity     turnips         Objective     Physical Exam:   Vital Signs:   Vitals:    06/17/22 0917   Weight: 104 kg (230 lb)   Height: 170.2 cm (67\")     Body mass index is 36.02 kg/m².     Physical Exam  Vitals " and nursing note reviewed.   Constitutional:       General: He is awake. He is not in acute distress.     Appearance: Normal appearance. He is well-developed. He is obese.   HENT:      Head: Normocephalic and atraumatic.      Right Ear: External ear normal.      Left Ear: External ear normal.      Nose: Nose normal.   Eyes:      Conjunctiva/sclera: Conjunctivae normal.   Pulmonary:      Effort: Pulmonary effort is normal.   Abdominal:      General: There is no distension.      Palpations: Abdomen is soft. There is no mass.      Tenderness: There is no abdominal tenderness. There is no right CVA tenderness, left CVA tenderness, guarding or rebound.      Hernia: No hernia is present. There is no hernia in the left inguinal area or right inguinal area.   Genitourinary:     Pubic Area: No rash.       Rectum: No mass or tenderness. Normal anal tone.   Musculoskeletal:      Cervical back: Normal range of motion.   Lymphadenopathy:      Lower Body: No right inguinal adenopathy. No left inguinal adenopathy.   Skin:     General: Skin is warm.   Neurological:      General: No focal deficit present.      Mental Status: He is alert and oriented to person, place, and time.   Psychiatric:         Behavior: Behavior normal. Behavior is cooperative.         Labs:   Brief Urine Lab Results     None               Lab Results   Component Value Date    GLUCOSE 102 (H) 08/10/2020    CALCIUM 9.9 08/10/2020     (L) 08/10/2020    K 4.5 08/10/2020    CO2 26.5 08/10/2020    CL 98 08/10/2020    BUN 12 08/10/2020    CREATININE 1.23 08/10/2020    EGFRIFNONA 64 08/10/2020    BCR 9.8 08/10/2020    ANIONGAP 7.5 08/10/2020       Lab Results   Component Value Date    WBC 6.03 02/18/2020    HGB 16.9 03/14/2022    HCT 50.0 03/14/2022    MCV 84.2 02/18/2020     02/18/2020       Lab Results   Component Value Date    PSA 0.771 03/14/2022       Images:   No Images in the past 120 days found..    Pathology:      Measures:   Tobacco:   Bentley  Carlos Enrique  reports that he quit smoking about 9 years ago. He has never used smokeless tobacco..      Urine Incontinence: Patient reports that he is not currently experiencing any symptoms of urinary incontinence.        Assessment / Plan      Assessment/Plan:   46 y.o. male who presented today for follow up of low T.  He has done extremely well on his current dosage of 100 mg/week taking 0.5 mL twice weekly.  We will obtain a hemoglobin/hematocrit today.  We will recheck his testosterone today.  Follow up in 6 months.  I will call him with any abnormalities.      Diagnoses and all orders for this visit:    1. Hypogonadism in male (Primary)  -     Testosterone  -     Hemoglobin & Hematocrit, Blood           Follow Up:   Return in about 6 months (around 12/17/2022).    I spent approximately 20 minutes providing clinical care for this patient; including review of patient's chart and provider documentation, face to face time spent with patient in examination room (obtaining history, performing physical exam, discussing diagnosis and management options), placing orders, and completing patient documentation.     Dylan Restrepo MD  [facility]

## 2022-06-24 ENCOUNTER — TELEPHONE (OUTPATIENT)
Dept: UROLOGY | Facility: CLINIC | Age: 47
End: 2022-06-24

## 2022-06-24 NOTE — TELEPHONE ENCOUNTER
Called patient to discuss hematocrit result.  He had polycythemia previously when he was on testosterone replacement therapy and required therapeutic blood donation.  We discussed that we will set him up with this again.  He was agreeable to this plan.

## 2022-07-08 ENCOUNTER — TELEPHONE (OUTPATIENT)
Dept: UROLOGY | Facility: CLINIC | Age: 47
End: 2022-07-08

## 2022-07-08 DIAGNOSIS — E29.1 HYPOGONADISM IN MALE: Primary | ICD-10-CM

## 2022-07-08 NOTE — TELEPHONE ENCOUNTER
I entered an order for the patient to have therapeutic phlebotomy done and called and left him a message to call the infusion clinic and make himself an appointment

## 2022-07-14 ENCOUNTER — INFUSION (OUTPATIENT)
Dept: ONCOLOGY | Facility: HOSPITAL | Age: 47
End: 2022-07-14

## 2022-07-14 VITALS
SYSTOLIC BLOOD PRESSURE: 148 MMHG | DIASTOLIC BLOOD PRESSURE: 85 MMHG | RESPIRATION RATE: 18 BRPM | OXYGEN SATURATION: 99 % | HEART RATE: 71 BPM | TEMPERATURE: 97.3 F

## 2022-07-14 DIAGNOSIS — E34.9 TESTOSTERONE DEFICIENCY: Primary | ICD-10-CM

## 2022-07-14 DIAGNOSIS — E29.1 HYPOGONADISM IN MALE: ICD-10-CM

## 2022-07-14 LAB
BASOPHILS # BLD AUTO: 0.07 10*3/MM3 (ref 0–0.2)
BASOPHILS NFR BLD AUTO: 0.8 % (ref 0–1.5)
DEPRECATED RDW RBC AUTO: 39.2 FL (ref 37–54)
EOSINOPHIL # BLD AUTO: 0.19 10*3/MM3 (ref 0–0.4)
EOSINOPHIL NFR BLD AUTO: 2.2 % (ref 0.3–6.2)
ERYTHROCYTE [DISTWIDTH] IN BLOOD BY AUTOMATED COUNT: 12.8 % (ref 12.3–15.4)
HCT VFR BLD AUTO: 54.4 % (ref 37.5–51)
HGB BLD-MCNC: 17.7 G/DL (ref 13–17.7)
IMM GRANULOCYTES # BLD AUTO: 0.02 10*3/MM3 (ref 0–0.05)
IMM GRANULOCYTES NFR BLD AUTO: 0.2 % (ref 0–0.5)
LYMPHOCYTES # BLD AUTO: 2.71 10*3/MM3 (ref 0.7–3.1)
LYMPHOCYTES NFR BLD AUTO: 30.9 % (ref 19.6–45.3)
Lab: NORMAL
MCH RBC QN AUTO: 27.4 PG (ref 26.6–33)
MCHC RBC AUTO-ENTMCNC: 32.5 G/DL (ref 31.5–35.7)
MCV RBC AUTO: 84.3 FL (ref 79–97)
MONOCYTES # BLD AUTO: 0.8 10*3/MM3 (ref 0.1–0.9)
MONOCYTES NFR BLD AUTO: 9.1 % (ref 5–12)
NEUTROPHILS NFR BLD AUTO: 4.97 10*3/MM3 (ref 1.7–7)
NEUTROPHILS NFR BLD AUTO: 56.8 % (ref 42.7–76)
NRBC BLD AUTO-RTO: 0 /100 WBC (ref 0–0.2)
PLATELET # BLD AUTO: 211 10*3/MM3 (ref 140–450)
PMV BLD AUTO: 10.6 FL (ref 6–12)
POST-BLOOD PRESSURE: NORMAL MMHG
PRE-BLOOD PRESSURE: NORMAL MMHG
PRE-HCT: 54.4 %
PRE-HGB: 17.7 G/DL
PULSE: 71 BPM
RBC # BLD AUTO: 6.45 10*6/MM3 (ref 4.14–5.8)
VOLUME COLLECTED: 500 ML
WBC NRBC COR # BLD: 8.76 10*3/MM3 (ref 3.4–10.8)

## 2022-07-14 PROCEDURE — 99195 PHLEBOTOMY: CPT

## 2022-07-14 PROCEDURE — 36415 COLL VENOUS BLD VENIPUNCTURE: CPT

## 2022-07-14 PROCEDURE — 85025 COMPLETE CBC W/AUTO DIFF WBC: CPT

## 2022-07-20 DIAGNOSIS — E29.1 HYPOGONADISM IN MALE: Primary | ICD-10-CM

## 2022-08-24 ENCOUNTER — INFUSION (OUTPATIENT)
Dept: ONCOLOGY | Facility: HOSPITAL | Age: 47
End: 2022-08-24

## 2022-08-24 VITALS
HEART RATE: 83 BPM | OXYGEN SATURATION: 97 % | TEMPERATURE: 98.4 F | DIASTOLIC BLOOD PRESSURE: 79 MMHG | SYSTOLIC BLOOD PRESSURE: 134 MMHG | RESPIRATION RATE: 18 BRPM

## 2022-08-24 DIAGNOSIS — E29.1 HYPOGONADISM IN MALE: Primary | ICD-10-CM

## 2022-08-24 LAB
BASOPHILS # BLD AUTO: 0.05 10*3/MM3 (ref 0–0.2)
BASOPHILS NFR BLD AUTO: 0.6 % (ref 0–1.5)
DEPRECATED RDW RBC AUTO: 40.5 FL (ref 37–54)
EOSINOPHIL # BLD AUTO: 0.17 10*3/MM3 (ref 0–0.4)
EOSINOPHIL NFR BLD AUTO: 2.1 % (ref 0.3–6.2)
ERYTHROCYTE [DISTWIDTH] IN BLOOD BY AUTOMATED COUNT: 13.3 % (ref 12.3–15.4)
HCT VFR BLD AUTO: 50.8 % (ref 37.5–51)
HGB BLD-MCNC: 16.3 G/DL (ref 13–17.7)
IMM GRANULOCYTES # BLD AUTO: 0.02 10*3/MM3 (ref 0–0.05)
IMM GRANULOCYTES NFR BLD AUTO: 0.2 % (ref 0–0.5)
LYMPHOCYTES # BLD AUTO: 2.31 10*3/MM3 (ref 0.7–3.1)
LYMPHOCYTES NFR BLD AUTO: 28.3 % (ref 19.6–45.3)
Lab: NORMAL
MCH RBC QN AUTO: 26.9 PG (ref 26.6–33)
MCHC RBC AUTO-ENTMCNC: 32.1 G/DL (ref 31.5–35.7)
MCV RBC AUTO: 83.7 FL (ref 79–97)
MONOCYTES # BLD AUTO: 0.72 10*3/MM3 (ref 0.1–0.9)
MONOCYTES NFR BLD AUTO: 8.8 % (ref 5–12)
NEUTROPHILS NFR BLD AUTO: 4.88 10*3/MM3 (ref 1.7–7)
NEUTROPHILS NFR BLD AUTO: 60 % (ref 42.7–76)
NRBC BLD AUTO-RTO: 0 /100 WBC (ref 0–0.2)
PLATELET # BLD AUTO: 238 10*3/MM3 (ref 140–450)
PMV BLD AUTO: 10.4 FL (ref 6–12)
POST-BLOOD PRESSURE: NORMAL MMHG
PRE-BLOOD PRESSURE: NORMAL MMHG
PRE-HCT: 50.8 %
PRE-HGB: 16.3 G/DL
PULSE: 83 BPM
RBC # BLD AUTO: 6.07 10*6/MM3 (ref 4.14–5.8)
VOLUME COLLECTED: 500 ML
WBC NRBC COR # BLD: 8.15 10*3/MM3 (ref 3.4–10.8)

## 2022-08-24 PROCEDURE — 99195 PHLEBOTOMY: CPT | Performed by: UROLOGY

## 2022-08-24 PROCEDURE — 99195 PHLEBOTOMY: CPT

## 2022-08-24 PROCEDURE — 85025 COMPLETE CBC W/AUTO DIFF WBC: CPT

## 2022-09-04 DIAGNOSIS — E29.1 HYPOGONADISM IN MALE: ICD-10-CM

## 2022-09-06 RX ORDER — TESTOSTERONE CYPIONATE 100 MG/ML
0.5 VIAL (ML) INTRAMUSCULAR 2 TIMES WEEKLY
Qty: 10 ML | Refills: 2 | Status: SHIPPED | OUTPATIENT
Start: 2022-09-08 | End: 2022-12-20 | Stop reason: SDUPTHER

## 2022-10-12 ENCOUNTER — APPOINTMENT (OUTPATIENT)
Dept: ONCOLOGY | Facility: HOSPITAL | Age: 47
End: 2022-10-12

## 2022-10-18 ENCOUNTER — INFUSION (OUTPATIENT)
Dept: ONCOLOGY | Facility: HOSPITAL | Age: 47
End: 2022-10-18

## 2022-10-18 VITALS
TEMPERATURE: 98.4 F | RESPIRATION RATE: 18 BRPM | DIASTOLIC BLOOD PRESSURE: 86 MMHG | HEART RATE: 66 BPM | OXYGEN SATURATION: 99 % | SYSTOLIC BLOOD PRESSURE: 170 MMHG

## 2022-10-18 DIAGNOSIS — E29.1 HYPOGONADISM IN MALE: Primary | ICD-10-CM

## 2022-10-18 LAB
BASOPHILS # BLD AUTO: 0.05 10*3/MM3 (ref 0–0.2)
BASOPHILS NFR BLD AUTO: 0.6 % (ref 0–1.5)
DEPRECATED RDW RBC AUTO: 38.9 FL (ref 37–54)
EOSINOPHIL # BLD AUTO: 0.19 10*3/MM3 (ref 0–0.4)
EOSINOPHIL NFR BLD AUTO: 2.3 % (ref 0.3–6.2)
ERYTHROCYTE [DISTWIDTH] IN BLOOD BY AUTOMATED COUNT: 13 % (ref 12.3–15.4)
HCT VFR BLD AUTO: 51.7 % (ref 37.5–51)
HGB BLD-MCNC: 16.2 G/DL (ref 13–17.7)
IMM GRANULOCYTES # BLD AUTO: 0.02 10*3/MM3 (ref 0–0.05)
IMM GRANULOCYTES NFR BLD AUTO: 0.2 % (ref 0–0.5)
LYMPHOCYTES # BLD AUTO: 2.32 10*3/MM3 (ref 0.7–3.1)
LYMPHOCYTES NFR BLD AUTO: 27.6 % (ref 19.6–45.3)
MCH RBC QN AUTO: 26 PG (ref 26.6–33)
MCHC RBC AUTO-ENTMCNC: 31.3 G/DL (ref 31.5–35.7)
MCV RBC AUTO: 82.9 FL (ref 79–97)
MONOCYTES # BLD AUTO: 0.85 10*3/MM3 (ref 0.1–0.9)
MONOCYTES NFR BLD AUTO: 10.1 % (ref 5–12)
NEUTROPHILS NFR BLD AUTO: 4.97 10*3/MM3 (ref 1.7–7)
NEUTROPHILS NFR BLD AUTO: 59.2 % (ref 42.7–76)
NRBC BLD AUTO-RTO: 0 /100 WBC (ref 0–0.2)
PLATELET # BLD AUTO: 225 10*3/MM3 (ref 140–450)
PMV BLD AUTO: 10.3 FL (ref 6–12)
RBC # BLD AUTO: 6.24 10*6/MM3 (ref 4.14–5.8)
WBC NRBC COR # BLD: 8.4 10*3/MM3 (ref 3.4–10.8)

## 2022-10-18 PROCEDURE — 85025 COMPLETE CBC W/AUTO DIFF WBC: CPT

## 2022-10-20 ENCOUNTER — TELEPHONE (OUTPATIENT)
Dept: UROLOGY | Facility: CLINIC | Age: 47
End: 2022-10-20

## 2022-10-20 NOTE — TELEPHONE ENCOUNTER
Called patient in regards to hematocrit.  He has been donating blood and was recently checked and advised he did not need to donate at that time.  He will follow up in [place].

## 2022-11-29 ENCOUNTER — TELEPHONE (OUTPATIENT)
Dept: UROLOGY | Facility: CLINIC | Age: 47
End: 2022-11-29

## 2022-11-29 ENCOUNTER — INFUSION (OUTPATIENT)
Dept: ONCOLOGY | Facility: HOSPITAL | Age: 47
End: 2022-11-29

## 2022-11-29 VITALS
SYSTOLIC BLOOD PRESSURE: 121 MMHG | DIASTOLIC BLOOD PRESSURE: 67 MMHG | TEMPERATURE: 98.6 F | HEART RATE: 66 BPM | OXYGEN SATURATION: 97 % | RESPIRATION RATE: 18 BRPM

## 2022-11-29 DIAGNOSIS — E34.9 TESTOSTERONE DEFICIENCY: Primary | ICD-10-CM

## 2022-11-29 DIAGNOSIS — E29.1 HYPOGONADISM IN MALE: ICD-10-CM

## 2022-11-29 LAB
BASOPHILS # BLD AUTO: 0.06 10*3/MM3 (ref 0–0.2)
BASOPHILS NFR BLD AUTO: 0.7 % (ref 0–1.5)
DEPRECATED RDW RBC AUTO: 42.3 FL (ref 37–54)
EOSINOPHIL # BLD AUTO: 0.21 10*3/MM3 (ref 0–0.4)
EOSINOPHIL NFR BLD AUTO: 2.4 % (ref 0.3–6.2)
ERYTHROCYTE [DISTWIDTH] IN BLOOD BY AUTOMATED COUNT: 14.7 % (ref 12.3–15.4)
HCT VFR BLD AUTO: 52.3 % (ref 37.5–51)
HGB BLD-MCNC: 16.7 G/DL (ref 13–17.7)
IMM GRANULOCYTES # BLD AUTO: 0.03 10*3/MM3 (ref 0–0.05)
IMM GRANULOCYTES NFR BLD AUTO: 0.3 % (ref 0–0.5)
LYMPHOCYTES # BLD AUTO: 2.68 10*3/MM3 (ref 0.7–3.1)
LYMPHOCYTES NFR BLD AUTO: 30.2 % (ref 19.6–45.3)
Lab: NORMAL
MCH RBC QN AUTO: 26.1 PG (ref 26.6–33)
MCHC RBC AUTO-ENTMCNC: 31.9 G/DL (ref 31.5–35.7)
MCV RBC AUTO: 81.6 FL (ref 79–97)
MONOCYTES # BLD AUTO: 0.69 10*3/MM3 (ref 0.1–0.9)
MONOCYTES NFR BLD AUTO: 7.8 % (ref 5–12)
NEUTROPHILS NFR BLD AUTO: 5.19 10*3/MM3 (ref 1.7–7)
NEUTROPHILS NFR BLD AUTO: 58.6 % (ref 42.7–76)
NRBC BLD AUTO-RTO: 0 /100 WBC (ref 0–0.2)
PLATELET # BLD AUTO: 212 10*3/MM3 (ref 140–450)
PMV BLD AUTO: 10.6 FL (ref 6–12)
POST-BLOOD PRESSURE: NORMAL MMHG
PRE-BLOOD PRESSURE: NORMAL MMHG
PRE-HCT: 52.3 %
PRE-HGB: 16.7 G/DL
PULSE: 84 BPM
RBC # BLD AUTO: 6.41 10*6/MM3 (ref 4.14–5.8)
VOLUME COLLECTED: 500 ML
WBC NRBC COR # BLD: 8.86 10*3/MM3 (ref 3.4–10.8)

## 2022-11-29 PROCEDURE — 99195 PHLEBOTOMY: CPT | Performed by: UROLOGY

## 2022-11-29 PROCEDURE — 99195 PHLEBOTOMY: CPT

## 2022-11-29 PROCEDURE — 36415 COLL VENOUS BLD VENIPUNCTURE: CPT

## 2022-11-29 PROCEDURE — 85025 COMPLETE CBC W/AUTO DIFF WBC: CPT

## 2022-11-29 NOTE — TELEPHONE ENCOUNTER
Oncology called regarding an order you had placed. Pt is exprencing joint pain and his hemoglobin is 16.7 is it ok for them to proceed with therapeutic phlebotomy? The order says his hemoglobin has to be greater then 18, but pt wants to continue. Please advise. Also if they can proceed then a new order will need to be placed.

## 2022-11-29 NOTE — SIGNIFICANT NOTE
Phlebotomy completed. Patient denies all c/o. States feel fine. Already starting to be less achy declined staying for further monitoring. Vss. No distress noted on leaving unit

## 2022-12-20 ENCOUNTER — OFFICE VISIT (OUTPATIENT)
Dept: UROLOGY | Facility: CLINIC | Age: 47
End: 2022-12-20

## 2022-12-20 VITALS — BODY MASS INDEX: 36.1 KG/M2 | HEIGHT: 67 IN | WEIGHT: 230 LBS

## 2022-12-20 DIAGNOSIS — E34.9 TESTOSTERONE DEFICIENCY: Primary | ICD-10-CM

## 2022-12-20 DIAGNOSIS — E29.1 HYPOGONADISM IN MALE: ICD-10-CM

## 2022-12-20 LAB
DEPRECATED RDW RBC AUTO: 42.1 FL (ref 37–54)
ERYTHROCYTE [DISTWIDTH] IN BLOOD BY AUTOMATED COUNT: 14.7 % (ref 12.3–15.4)
ESTRADIOL SERPL HS-MCNC: 36.8 PG/ML
HCT VFR BLD AUTO: 50.5 % (ref 37.5–51)
HGB BLD-MCNC: 15.6 G/DL (ref 13–17.7)
MCH RBC QN AUTO: 24.5 PG (ref 26.6–33)
MCHC RBC AUTO-ENTMCNC: 30.9 G/DL (ref 31.5–35.7)
MCV RBC AUTO: 79.4 FL (ref 79–97)
PLATELET # BLD AUTO: 241 10*3/MM3 (ref 140–450)
PMV BLD AUTO: 11.6 FL (ref 6–12)
PSA SERPL-MCNC: 0.83 NG/ML (ref 0–4)
RBC # BLD AUTO: 6.36 10*6/MM3 (ref 4.14–5.8)
TESTOST SERPL-MCNC: 535 NG/DL (ref 249–836)
WBC NRBC COR # BLD: 7.7 10*3/MM3 (ref 3.4–10.8)

## 2022-12-20 PROCEDURE — 85027 COMPLETE CBC AUTOMATED: CPT | Performed by: UROLOGY

## 2022-12-20 PROCEDURE — 99214 OFFICE O/P EST MOD 30 MIN: CPT | Performed by: UROLOGY

## 2022-12-20 PROCEDURE — 84153 ASSAY OF PSA TOTAL: CPT | Performed by: UROLOGY

## 2022-12-20 PROCEDURE — 84403 ASSAY OF TOTAL TESTOSTERONE: CPT | Performed by: UROLOGY

## 2022-12-20 PROCEDURE — 82670 ASSAY OF TOTAL ESTRADIOL: CPT | Performed by: UROLOGY

## 2022-12-20 RX ORDER — TESTOSTERONE CYPIONATE 100 MG/ML
0.5 VIAL (ML) INTRAMUSCULAR 2 TIMES WEEKLY
Qty: 10 ML | Refills: 2 | Status: SHIPPED | OUTPATIENT
Start: 2022-12-22

## 2022-12-20 NOTE — PROGRESS NOTES
"Chief Complaint:      Chief Complaint   Patient presents with   • Low testosterone     6 month follow up       HPI:   47 y.o. male patient returns today for follow-up.  He has been on testosterone replacement therapy.  He reports a dramatic improvement in his ANDREI questionnaire: -ANDREI-androgen deficiency in the age male questionnaire. The patient was queried regarding the androgen deficiency in the age male questionnaire.  This is a validated questionnaire that was performed on a set of 314 [place] male physicians. When it was positive it correlated directly with a 94% chance of low testosterone.  Patient indicates there is a decrease in libido or sex drive, a lack of energy, decreased  strength and endurance, a decreased \"enjoyment of life\", sad and grumpy feelings with significant difficulty maintaining erections.  There has also been a recent deterioration regarding work performance. He reports weight loss.  He has good facility and the use of subcutaneous and intramuscular injections as well as comfort level and using the medication in a sterile fashion.  He understands he should use only the prescribed dose.  He is here for appropriate lab monitoring regarding this.  He understands this is a controlled substance and therefore must be watched closely, will not be refilled in the medical loss or miscalculation of the dose.  He is very happy with the treatment and therefore wants to continue it.    Past Medical History:     Past Medical History:   Diagnosis Date   • Fatigue    • Hyperlipidemia    • Hypertension    • Hypogonadism in male    • Low testosterone    • Seizures (HCC)     10/24/2019 last seizure       Current Meds:     Current Outpatient Medications   Medication Sig Dispense Refill   • Brivaracetam 100 MG tablet Take 1 tablet by mouth 2 (two) times a day.     • cetirizine (zyrTEC) 10 MG tablet Take 10 mg by mouth Daily.     • fluticasone (FLONASE) 50 MCG/ACT nasal spray 2 sprays into the nostril(s) as " directed by provider Daily. 48 g 11   • lisinopril (PRINIVIL,ZESTRIL) 10 MG tablet TAKE 1/2 TABLET BY MOUTH DAILY 90 tablet 3   • metoprolol tartrate (LOPRESSOR) 25 MG tablet Take 1 tablet by mouth Daily. 90 tablet 3   • pravastatin (PRAVACHOL) 40 MG tablet TAKE ONE TABLET BY MOUTH ONCE NIGHTLY 90 tablet 3   • Testosterone Cypionate 100 MG/ML solution Inject 0.5 mL as directed 2 (Two) Times a Week. 10 mL 2     No current facility-administered medications for this visit.        Allergies:      Allergies   Allergen Reactions   • Fish-Derived Products GI Intolerance     Scallops and mackeral   • Other Unknown - Low Severity     turnips        Past Surgical History:     Past Surgical History:   Procedure Laterality Date   • MOUTH SURGERY     • TONSILLECTOMY         Social History:     Social History     Socioeconomic History   • Marital status:    Tobacco Use   • Smoking status: Former     Types: Cigarettes     Quit date: 2013     Years since quittin.6   • Smokeless tobacco: Never   Substance and Sexual Activity   • Alcohol use: No   • Drug use: No   • Sexual activity: Defer       Family History:     Family History   Problem Relation Age of Onset   • Prostate cancer Other    • Diabetes Mother    • Thyroid disease Father    • Hyperlipidemia Father    • Atrial fibrillation Father    • Diabetes Maternal Grandmother    • Anemia Paternal Grandmother        Review of Systems:     Review of Systems   Constitutional: Negative.    HENT: Negative.    Eyes: Negative.    Respiratory: Negative.    Cardiovascular: Negative.    Gastrointestinal: Negative.    Endocrine: Negative.    Musculoskeletal: Negative.    Allergic/Immunologic: Negative.    Neurological: Negative.    Hematological: Negative.    Psychiatric/Behavioral: Negative.        Physical Exam:     Physical Exam  Vitals and nursing note reviewed.   Constitutional:       Appearance: He is well-developed.   HENT:      Head: Normocephalic and atraumatic.   Eyes:       Conjunctiva/sclera: Conjunctivae normal.      Pupils: Pupils are equal, round, and reactive to light.   Cardiovascular:      Rate and Rhythm: Normal rate and regular rhythm.      Heart sounds: Normal heart sounds.   Pulmonary:      Effort: Pulmonary effort is normal.      Breath sounds: Normal breath sounds.   Abdominal:      General: Bowel sounds are normal.      Palpations: Abdomen is soft.   Musculoskeletal:         General: Normal range of motion.      Cervical back: Normal range of motion.   Skin:     General: Skin is warm and dry.   Neurological:      Mental Status: He is alert and oriented to person, place, and time.      Deep Tendon Reflexes: Reflexes are normal and symmetric.   Psychiatric:         Behavior: Behavior normal.         Thought Content: Thought content normal.         Judgment: Judgment normal.         I have reviewed the following portions of the patient's history: Allergies, current medications, past family history, past medical history, past social history, past surgical history, problem list, and ROS and confirm it is accurate.    Recent Image (CT and/or KUB):      CT Abdomen and Pelvis: No results found for this or any previous visit.       CT Stone Protocol: No results found for this or any previous visit.       KUB: No results found for this or any previous visit.       Labs (past 3 months):      Infusion on 11/29/2022   Component Date Value Ref Range Status   • WBC 11/29/2022 8.86  3.40 - 10.80 10*3/mm3 Final   • RBC 11/29/2022 6.41 (H)  4.14 - 5.80 10*6/mm3 Final   • Hemoglobin 11/29/2022 16.7  13.0 - 17.7 g/dL Final   • Hematocrit 11/29/2022 52.3 (H)  37.5 - 51.0 % Final   • MCV 11/29/2022 81.6  79.0 - 97.0 fL Final   • MCH 11/29/2022 26.1 (L)  26.6 - 33.0 pg Final   • MCHC 11/29/2022 31.9  31.5 - 35.7 g/dL Final   • RDW 11/29/2022 14.7  12.3 - 15.4 % Final   • RDW-SD 11/29/2022 42.3  37.0 - 54.0 fl Final   • MPV 11/29/2022 10.6  6.0 - 12.0 fL Final   • Platelets 11/29/2022 212   140 - 450 10*3/mm3 Final   • Neutrophil % 11/29/2022 58.6  42.7 - 76.0 % Final   • Lymphocyte % 11/29/2022 30.2  19.6 - 45.3 % Final   • Monocyte % 11/29/2022 7.8  5.0 - 12.0 % Final   • Eosinophil % 11/29/2022 2.4  0.3 - 6.2 % Final   • Basophil % 11/29/2022 0.7  0.0 - 1.5 % Final   • Immature Grans % 11/29/2022 0.3  0.0 - 0.5 % Final   • Neutrophils, Absolute 11/29/2022 5.19  1.70 - 7.00 10*3/mm3 Final   • Lymphocytes, Absolute 11/29/2022 2.68  0.70 - 3.10 10*3/mm3 Final   • Monocytes, Absolute 11/29/2022 0.69  0.10 - 0.90 10*3/mm3 Final   • Eosinophils, Absolute 11/29/2022 0.21  0.00 - 0.40 10*3/mm3 Final   • Basophils, Absolute 11/29/2022 0.06  0.00 - 0.20 10*3/mm3 Final   • Immature Grans, Absolute 11/29/2022 0.03  0.00 - 0.05 10*3/mm3 Final   • nRBC 11/29/2022 0.0  0.0 - 0.2 /100 WBC Final   • Pulse 11/29/2022 84  BPM Final   • Volume Collected 11/29/2022 500  mL Final   • Pre-Hgb 11/29/2022 16.7  g/dL Final   • Pre-Hct 11/29/2022 52.3  % Final   • Pre-Blood Pressure 11/29/2022 178/90  mmHg Final   • Post-Blood Pressure 11/29/2022 121/67  mmHg Final   • Lot Number 11/29/2022 [id number]   Final   Infusion on 10/18/2022   Component Date Value Ref Range Status   • WBC 10/18/2022 8.40  3.40 - 10.80 10*3/mm3 Final   • RBC 10/18/2022 6.24 (H)  4.14 - 5.80 10*6/mm3 Final   • Hemoglobin 10/18/2022 16.2  13.0 - 17.7 g/dL Final   • Hematocrit 10/18/2022 51.7 (H)  37.5 - 51.0 % Final   • MCV 10/18/2022 82.9  79.0 - 97.0 fL Final   • MCH 10/18/2022 26.0 (L)  26.6 - 33.0 pg Final   • MCHC 10/18/2022 31.3 (L)  31.5 - 35.7 g/dL Final   • RDW 10/18/2022 13.0  12.3 - 15.4 % Final   • RDW-SD 10/18/2022 38.9  37.0 - 54.0 fl Final   • MPV 10/18/2022 10.3  6.0 - 12.0 fL Final   • Platelets 10/18/2022 225  140 - 450 10*3/mm3 Final   • Neutrophil % 10/18/2022 59.2  42.7 - 76.0 % Final   • Lymphocyte % 10/18/2022 27.6  19.6 - 45.3 % Final   • Monocyte % 10/18/2022 10.1  5.0 - 12.0 % Final   • Eosinophil % 10/18/2022 2.3  0.3 - 6.2 %  Final   • Basophil % 10/18/2022 0.6  0.0 - 1.5 % Final   • Immature Grans % 10/18/2022 0.2  0.0 - 0.5 % Final   • Neutrophils, Absolute 10/18/2022 4.97  1.70 - 7.00 10*3/mm3 Final   • Lymphocytes, Absolute 10/18/2022 2.32  0.70 - 3.10 10*3/mm3 Final   • Monocytes, Absolute 10/18/2022 0.85  0.10 - 0.90 10*3/mm3 Final   • Eosinophils, Absolute 10/18/2022 0.19  0.00 - 0.40 10*3/mm3 Final   • Basophils, Absolute 10/18/2022 0.05  0.00 - 0.20 10*3/mm3 Final   • Immature Grans, Absolute 10/18/2022 0.02  0.00 - 0.05 10*3/mm3 Final   • nRBC 10/18/2022 0.0  0.0 - 0.2 /100 WBC Final        Procedure:       Assessment/Plan:   Low testosterone: Patient is here for follow-up.  Since beginning the medication, he has been very pleased.  He reports a dramatic improvement in his erections, ability to achieve and maintain an erection, improvement in libido, increase in frequency of morning erections, and a noticeable weight loss consistent with the treatment.   He is going to have appropriate safety laboratory parameters checked.   He understands that the new data implicates testosterone with the development of prostate cancer and this is all but been disproven and the medical literature as well as the risks of cardiovascular disease which has actually also been disproven.  He understands that while he is a candidate for topical therapy if he is in contact with children this is not an option because it has been shown to accentuate genitalia development at an early age that is frequently irreversible.  He also understands this it is a controlled substance and as such will not be prescribed without appropriate follow-up and appropriate laboratory investigation.  He understands effects on spermatogenesis including the fact that this is not always completely reversible and not always completely limited his ability to father a child.  He has demonstrated facility in the technique of both intramuscular and subcutaneous injection and has  been taught sterility when drawing up the medication.    Erectile dysfunction-we discussed the anatomy and physiology of the penis and the endothelium.  We discussed the various forms of erectile dysfunction including peripheral vascular occlusive disease, postoperative, secondary to radiation treatments of the prostate, and arterial inflow.  We discussed the various treatment options available including oral medication and its various forms.  We discussed the use of both generic and non-generic Viagra.  We discussed Cialis and a longer half-life of 17 hours as well as the other 2 medications.  We discussed cost involved with this including the fact that the generic is much cheaper but is taken as multiple pills because they are 20 mg dosages.  We did discuss the other alternatives including penile injections, vacuum erection devices, and surgical intervention reserved for only the most severe cases.  We discussed the need for testosterone in about 20% of cases of erectile dysfunction.  Continue PDE-5 inhibition    PSA testing-I am recommending a PSA blood test that stands for prostate specific antigen.  I discussed the pathophysiology of PSA testing indicating its use in the diagnosis and management of prostate cancer.  I discussed the normal range being 0 to 4, but more appropriately being much closer to 0 to 2 in a normal male.  I discussed the fact that after a certain age we don't recommend PSA testing especially in view of numerous comorbidities, that this will not be a useful test.  I discussed many of the things that can artificially raise PSA including a recent infection, urinary tract infection, and recent sexual intercourse, or even the type of movement such as manipulation of the prostate from riding a bicycle.  After all this is taken into account when the test is reviewed, the most important use of PSA is the velocity measurement.  In other words, the change of PSA with time is a very important factor  in the use and that we look for greater than 20% rise over a year to help us make the prediction of prostate cancer.  I also discussed that the use with prostate cancer indicating that after a radical prostatectomy, the PSA should be 0 and any rise indicates an early biochemical recurrence.    Hyperestrogenism-we spoke about the role of estrogen metabolism and breakdown in the  presence of testosterone replacement therapy.  We spoke about how high estradiol levels can interfere with the improvement noted in a man on testosterone as well as significant side effects such as pseudogynecomastia.  We discussed the use of the medication Arimidex used in an off label setting and using a very judicious low-dose fashion to prevent too low of an estradiol which would precipitate bone complications.  Going to check an estradiol level.  He is at higher risk for breast problems due to his replacement    Polycythemia-I am going to check a CBC to rule out hemoglobin changes.  We utilized the American Heart Association guidelines for polycythemia which is a hemoglobin greater than 18 and a hematocrit greater than 54.5.  Recommend therapeutic phlebotomy as the treatment.  It is important that we indicate that is the most likely cause of the polycythemia.  We also discussed the possibility of decreasing the dose of testosterone and of stopping it altogether.    Controlled substance-he understands this is a controlled substance and as such is regulated under the state.  I cannot refill it outside the prescribed window.  I stressed the importance of follow-up and appropriate laboratory parameters monitoring.    Liver function tests-according to the AUA guidelines we will not check liver functions due to the fact this is not an oral alkylated testosterone                This document has been electronically signed by SHARRON FELDMAN MD December 20, 2022 08:08 EST    Dictated Utilizing Dragon Dictation: Part of this note may be an  electronic transcription/translation of spoken language to printed text using the Dragon Dictation System.

## 2022-12-27 ENCOUNTER — TELEPHONE (OUTPATIENT)
Dept: UROLOGY | Facility: CLINIC | Age: 47
End: 2022-12-27

## 2023-11-30 ENCOUNTER — TELEPHONE (OUTPATIENT)
Dept: UROLOGY | Facility: CLINIC | Age: 48
End: 2023-11-30
Payer: COMMERCIAL

## 2023-11-30 NOTE — TELEPHONE ENCOUNTER
PA for Testosterone has been sent to pt's insurance company and APPROVED!!                    Approved today  PA Case: [id number], Status: Approved, Coverage Starts on: 11/30/2023 12:00:00 AM, Coverage Ends on: 11/29/2024 12:00:00 AM.  Drug  Testosterone Cypionate 100MG/ML intramuscular solution  Form  [facility] Commercial Electronic PA Form (2017 NCPDP)

## 2023-11-30 NOTE — TELEPHONE ENCOUNTER
PA for Testosterone has been sent to pt's insurance company and APPROVED!!              Approved today  PA Case: [id number], Status: Approved, Coverage Starts on: 11/30/2023 12:00:00 AM, Coverage Ends on: 11/29/2024 12:00:00 AM.  Drug  Testosterone Cypionate 100MG/ML intramuscular solution  Form  [facility] Commercial Electronic PA Form (2017 NCPDP)

## 2023-12-19 ENCOUNTER — OFFICE VISIT (OUTPATIENT)
Dept: UROLOGY | Facility: CLINIC | Age: 48
End: 2023-12-19
Payer: COMMERCIAL

## 2023-12-19 VITALS
HEIGHT: 67 IN | DIASTOLIC BLOOD PRESSURE: 85 MMHG | BODY MASS INDEX: 34.06 KG/M2 | SYSTOLIC BLOOD PRESSURE: 129 MMHG | WEIGHT: 217 LBS | HEART RATE: 78 BPM

## 2023-12-19 DIAGNOSIS — R73.03 PRE-DIABETES: ICD-10-CM

## 2023-12-19 DIAGNOSIS — E78.00 ELEVATED CHOLESTEROL: ICD-10-CM

## 2023-12-19 DIAGNOSIS — N42.9 DISORDER OF PROSTATE: ICD-10-CM

## 2023-12-19 DIAGNOSIS — E29.1 HYPOGONADISM IN MALE: ICD-10-CM

## 2023-12-19 DIAGNOSIS — E34.9 TESTOSTERONE DEFICIENCY: Primary | ICD-10-CM

## 2023-12-19 LAB
ALBUMIN SERPL-MCNC: 4.4 G/DL (ref 3.5–5.2)
ALBUMIN/GLOB SERPL: 1.5 G/DL
ALP SERPL-CCNC: 210 U/L (ref 39–117)
ALT SERPL W P-5'-P-CCNC: 73 U/L (ref 1–41)
ANION GAP SERPL CALCULATED.3IONS-SCNC: 9.8 MMOL/L (ref 5–15)
AST SERPL-CCNC: 38 U/L (ref 1–40)
BILIRUB SERPL-MCNC: 0.2 MG/DL (ref 0–1.2)
BUN SERPL-MCNC: 13 MG/DL (ref 6–20)
BUN/CREAT SERPL: 11 (ref 7–25)
CALCIUM SPEC-SCNC: 10.1 MG/DL (ref 8.6–10.5)
CHLORIDE SERPL-SCNC: 104 MMOL/L (ref 98–107)
CHOLEST SERPL-MCNC: 209 MG/DL (ref 0–200)
CO2 SERPL-SCNC: 26.2 MMOL/L (ref 22–29)
CREAT SERPL-MCNC: 1.18 MG/DL (ref 0.76–1.27)
DEPRECATED RDW RBC AUTO: 46.1 FL (ref 37–54)
EGFRCR SERPLBLD CKD-EPI 2021: 76.6 ML/MIN/1.73
ERYTHROCYTE [DISTWIDTH] IN BLOOD BY AUTOMATED COUNT: 16.2 % (ref 12.3–15.4)
ESTRADIOL SERPL HS-MCNC: 18.6 PG/ML
GLOBULIN UR ELPH-MCNC: 2.9 GM/DL
GLUCOSE SERPL-MCNC: 109 MG/DL (ref 65–99)
HBA1C MFR BLD: 5.9 % (ref 4.8–5.6)
HCT VFR BLD AUTO: 51.6 % (ref 37.5–51)
HDLC SERPL-MCNC: 62 MG/DL (ref 40–60)
HGB BLD-MCNC: 17.1 G/DL (ref 13–17.7)
LDLC SERPL CALC-MCNC: 117 MG/DL (ref 0–100)
LDLC/HDLC SERPL: 1.82 {RATIO}
MCH RBC QN AUTO: 27.2 PG (ref 26.6–33)
MCHC RBC AUTO-ENTMCNC: 33.1 G/DL (ref 31.5–35.7)
MCV RBC AUTO: 82.2 FL (ref 79–97)
PLATELET # BLD AUTO: 212 10*3/MM3 (ref 140–450)
PMV BLD AUTO: 12.1 FL (ref 6–12)
POTASSIUM SERPL-SCNC: 5 MMOL/L (ref 3.5–5.2)
PROT SERPL-MCNC: 7.3 G/DL (ref 6–8.5)
PSA SERPL-MCNC: 0.76 NG/ML (ref 0–4)
RBC # BLD AUTO: 6.28 10*6/MM3 (ref 4.14–5.8)
SODIUM SERPL-SCNC: 140 MMOL/L (ref 136–145)
TESTOST SERPL-MCNC: 357 NG/DL (ref 249–836)
TRIGL SERPL-MCNC: 171 MG/DL (ref 0–150)
VLDLC SERPL-MCNC: 30 MG/DL (ref 5–40)
WBC NRBC COR # BLD AUTO: 10.57 10*3/MM3 (ref 3.4–10.8)

## 2023-12-19 PROCEDURE — 84153 ASSAY OF PSA TOTAL: CPT | Performed by: UROLOGY

## 2023-12-19 PROCEDURE — 84403 ASSAY OF TOTAL TESTOSTERONE: CPT | Performed by: UROLOGY

## 2023-12-19 PROCEDURE — 83036 HEMOGLOBIN GLYCOSYLATED A1C: CPT | Performed by: UROLOGY

## 2023-12-19 PROCEDURE — 80053 COMPREHEN METABOLIC PANEL: CPT | Performed by: UROLOGY

## 2023-12-19 PROCEDURE — 99214 OFFICE O/P EST MOD 30 MIN: CPT | Performed by: UROLOGY

## 2023-12-19 PROCEDURE — 85027 COMPLETE CBC AUTOMATED: CPT | Performed by: UROLOGY

## 2023-12-19 PROCEDURE — 82670 ASSAY OF TOTAL ESTRADIOL: CPT | Performed by: UROLOGY

## 2023-12-19 PROCEDURE — 80061 LIPID PANEL: CPT | Performed by: UROLOGY

## 2023-12-19 RX ORDER — TESTOSTERONE CYPIONATE 200 MG/ML
INJECTION, SOLUTION INTRAMUSCULAR
Qty: 10 ML | Refills: 2 | Status: SHIPPED | OUTPATIENT
Start: 2023-12-19

## 2023-12-19 NOTE — PROGRESS NOTES
"Chief Complaint:      Chief Complaint   Patient presents with    Low Testosteone     6 month        HPI:   48 y.o. male patient returns today for follow-up.  He has been on testosterone replacement therapy.  He reports a dramatic improvement in his ANDREI questionnaire: -ANDREI-androgen deficiency in the age male questionnaire. The patient was queried regarding the androgen deficiency in the age male questionnaire.  This is a validated questionnaire that was performed on a set of 314 [place] male physicians. When it was positive it correlated directly with a 94% chance of low testosterone.  Patient indicates there is a decrease in libido or sex drive, a lack of energy, decreased  strength and endurance, a decreased \"enjoyment of life\", sad and grumpy feelings with significant difficulty maintaining erections.  There has also been a recent deterioration regarding work performance. He reports weight loss.  He has good facility and the use of subcutaneous and intramuscular injections as well as comfort level and using the medication in a sterile fashion.  He understands he should use only the prescribed dose.  He is here for appropriate lab monitoring regarding this.  He understands this is a controlled substance and therefore must be watched closely, will not be refilled in the medical loss or miscalculation of the dose.  He is very happy with the treatment and therefore wants to continue it.    Past Medical History:     Past Medical History:   Diagnosis Date    Fatigue     Hyperlipidemia     Hypertension     Hypogonadism in male     Low testosterone     Seizures     10/24/2019 last seizure       Current Meds:     Current Outpatient Medications   Medication Sig Dispense Refill    Brivaracetam 100 MG tablet Take 1 tablet by mouth 2 (two) times a day.      cetirizine (zyrTEC) 10 MG tablet Take 1 tablet by mouth Daily.      fluticasone (FLONASE) 50 MCG/ACT nasal spray 2 sprays into the nostril(s) as directed by provider " "Daily. 48 g 11    lisinopril (PRINIVIL,ZESTRIL) 10 MG tablet TAKE 1/2 TABLET BY MOUTH DAILY 90 tablet 3    metoprolol tartrate (LOPRESSOR) 25 MG tablet Take 1 tablet by mouth Daily. 90 tablet 3    pravastatin (PRAVACHOL) 40 MG tablet TAKE ONE TABLET BY MOUTH ONCE NIGHTLY 90 tablet 3    Syringe 25G X 5/8\" 3 ML misc Use as directed 2 x weekly 24 each 3    Testosterone Cypionate 100 MG/ML solution Inject 0.5 mL as directed 2 (Two) Times a Week. 10 mL 2     No current facility-administered medications for this visit.        Allergies:      Allergies   Allergen Reactions    Fish-Derived Products GI Intolerance     Scallops and mackeral    Other Unknown - Low Severity     turnips        Past Surgical History:     Past Surgical History:   Procedure Laterality Date    MOUTH SURGERY      TONSILLECTOMY         Social History:     Social History     Socioeconomic History    Marital status:    Tobacco Use    Smoking status: Former     Types: Cigarettes     Quit date: 4/29/2013     Years since quitting: 10.6    Smokeless tobacco: Never   Vaping Use    Vaping Use: Never used   Substance and Sexual Activity    Alcohol use: No    Drug use: No    Sexual activity: Defer       Family History:     Family History   Problem Relation Age of Onset    Prostate cancer Other     Diabetes Mother     Thyroid disease Father     Hyperlipidemia Father     Atrial fibrillation Father     Diabetes Maternal Grandmother     Anemia Paternal Grandmother        Review of Systems:     Review of Systems   Constitutional: Negative.    HENT: Negative.     Eyes: Negative.    Respiratory: Negative.     Cardiovascular: Negative.    Gastrointestinal: Negative.    Endocrine: Negative.    Musculoskeletal: Negative.    Allergic/Immunologic: Negative.    Neurological: Negative.    Hematological: Negative.    Psychiatric/Behavioral: Negative.         Physical Exam:     Physical Exam  Vitals and nursing note reviewed.   Constitutional:       Appearance: He is " well-developed.   HENT:      Head: Normocephalic and atraumatic.   Eyes:      Conjunctiva/sclera: Conjunctivae normal.      Pupils: Pupils are equal, round, and reactive to light.   Cardiovascular:      Rate and Rhythm: Normal rate and regular rhythm.      Heart sounds: Normal heart sounds.   Pulmonary:      Effort: Pulmonary effort is normal.      Breath sounds: Normal breath sounds.   Abdominal:      General: Bowel sounds are normal.      Palpations: Abdomen is soft.   Musculoskeletal:         General: Normal range of motion.      Cervical back: Normal range of motion.   Skin:     General: Skin is warm and dry.   Neurological:      Mental Status: He is alert and oriented to person, place, and time.      Deep Tendon Reflexes: Reflexes are normal and symmetric.   Psychiatric:         Behavior: Behavior normal.         Thought Content: Thought content normal.         Judgment: Judgment normal.         I have reviewed the following portions of the patient's history: Allergies, current medications, past family history, past medical history, past social history, past surgical history, problem list, and ROS and confirm it is accurate.    Recent Image (CT and/or KUB):      CT Abdomen and Pelvis: No results found for this or any previous visit.       CT Stone Protocol: No results found for this or any previous visit.       KUB: No results found for this or any previous visit.       Labs (past 3 months):      No visits with results within 3 Month(s) from this visit.   Latest known visit with results is:   Office Visit on 06/20/2023   Component Date Value Ref Range Status    PSA 06/20/2023 0.831  0.000 - 4.000 ng/mL Final    Testosterone, Total 06/20/2023 326.00  249.00 - 836.00 ng/dL Final    Estradiol 06/20/2023 26.0  pg/mL Final    WBC 06/20/2023 8.41  3.40 - 10.80 10*3/mm3 Final    RBC 06/20/2023 6.57 (H)  4.14 - 5.80 10*6/mm3 Final    Hemoglobin 06/20/2023 16.9  13.0 - 17.7 g/dL Final    Hematocrit 06/20/2023 51.7 (H)   37.5 - 51.0 % Final    MCV 06/20/2023 78.7 (L)  79.0 - 97.0 fL Final    MCH 06/20/2023 25.7 (L)  26.6 - 33.0 pg Final    MCHC 06/20/2023 32.7  31.5 - 35.7 g/dL Final    RDW 06/20/2023 17.1 (H)  12.3 - 15.4 % Final    RDW-SD 06/20/2023 43.6  37.0 - 54.0 fl Final    MPV 06/20/2023 11.9  6.0 - 12.0 fL Final    Platelets 06/20/2023 254  140 - 450 10*3/mm3 Final        Procedure:       Assessment/Plan:   Low testosterone: Patient is here for follow-up.  Since beginning the medication, he has been very pleased.  He reports a dramatic improvement in his erections, ability to achieve and maintain an erection, improvement in libido, increase in frequency of morning erections, and a noticeable weight loss consistent with the treatment.   He is going to have appropriate safety laboratory parameters checked.   He understands that the new data implicates testosterone with the development of prostate cancer and this is all but been disproven and the medical literature as well as the risks of cardiovascular disease which has actually also been disproven.  He understands that while he is a candidate for topical therapy if he is in contact with children this is not an option because it has been shown to accentuate genitalia development at an early age that is frequently irreversible.  He also understands this it is a controlled substance and as such will not be prescribed without appropriate follow-up and appropriate laboratory investigation.  He understands effects on spermatogenesis including the fact that this is not always completely reversible and not always completely limited his ability to father a child.  He has demonstrated facility in the technique of both intramuscular and subcutaneous injection and has been taught sterility when drawing up the medication.    Erectile dysfunction-we discussed the anatomy and physiology of the penis and the endothelium.  We discussed the various forms of erectile dysfunction including  peripheral vascular occlusive disease, postoperative, secondary to radiation treatments of the prostate, and arterial inflow.  We discussed the various treatment options available including oral medication and its various forms.  We discussed the use of both generic and non-generic Viagra.  We discussed Cialis and a longer half-life of 17 hours as well as the other 2 medications.  We discussed cost involved with this including the fact that the generic is much cheaper but is taken as multiple pills because they are 20 mg dosages.  We did discuss the other alternatives including penile injections, vacuum erection devices, and surgical intervention reserved for only the most severe cases.  We discussed the need for testosterone in about 20% of cases of erectile dysfunction.  Continue PDE-5 inhibition    PSA testing-I am recommending a PSA blood test that stands for prostate specific antigen.  I discussed the pathophysiology of PSA testing indicating its use in the diagnosis and management of prostate cancer.  I discussed the normal range being 0 to 4, but more appropriately being much closer to 0 to 2 in a normal male.  I discussed the fact that after a certain age we don't recommend PSA testing especially in view of numerous comorbidities, that this will not be a useful test.  I discussed many of the things that can artificially raise PSA including a recent infection, urinary tract infection, and recent sexual intercourse, or even the type of movement such as manipulation of the prostate from riding a bicycle.  After all this is taken into account when the test is reviewed, the most important use of PSA is the velocity measurement.  In other words, the change of PSA with time is a very important factor in the use and that we look for greater than 20% rise over a year to help us make the prediction of prostate cancer.  I also discussed that the use with prostate cancer indicating that after a radical prostatectomy,  the PSA should be 0 and any rise indicates an early biochemical recurrence.    Hyperestrogenism-we spoke about the role of estrogen metabolism and breakdown in the  presence of testosterone replacement therapy.  We spoke about how high estradiol levels can interfere with the improvement noted in a man on testosterone as well as significant side effects such as pseudogynecomastia.  We discussed the use of the medication Arimidex used in an off label setting and using a very judicious low-dose fashion to prevent too low of an estradiol which would precipitate bone complications.  Going to check an estradiol level.  He is at higher risk for breast problems due to his replacement    Polycythemia-I am going to check a CBC to rule out hemoglobin changes.  We utilized the American Heart Association guidelines for polycythemia which is a hemoglobin greater than 18 and a hematocrit greater than 54.5.  Recommend therapeutic phlebotomy as the treatment.  It is important that we indicate that is the most likely cause of the polycythemia.  We also discussed the possibility of decreasing the dose of testosterone and of stopping it altogether.    Controlled substance-he understands this is a controlled substance and as such is regulated under the state.  I cannot refill it outside the prescribed window.  I stressed the importance of follow-up and appropriate laboratory parameters monitoring.    Liver function tests-according to the AUA guidelines we will not check liver functions due to the fact this is not an oral alkylated testosterone        This document has been electronically signed by SHARRON FELDMAN MD December 19, 2023 08:06 EST    Dictated Utilizing Dragon Dictation: Part of this note may be an electronic transcription/translation of spoken language to printed text using the Dragon Dictation System.

## 2024-05-26 DIAGNOSIS — E29.1 HYPOGONADISM IN MALE: ICD-10-CM

## 2024-05-28 RX ORDER — TESTOSTERONE CYPIONATE 200 MG/ML
INJECTION, SOLUTION INTRAMUSCULAR
Qty: 8 ML | Refills: 3 | OUTPATIENT
Start: 2024-05-28

## 2024-06-26 ENCOUNTER — LAB (OUTPATIENT)
Dept: UROLOGY | Facility: CLINIC | Age: 49
End: 2024-06-26
Payer: COMMERCIAL

## 2024-06-26 DIAGNOSIS — E29.1 HYPOGONADISM IN MALE: Primary | ICD-10-CM

## 2024-06-26 DIAGNOSIS — R73.03 PRE-DIABETES: ICD-10-CM

## 2024-06-26 DIAGNOSIS — N42.9 DISORDER OF PROSTATE: ICD-10-CM

## 2024-06-26 LAB
CHOLEST SERPL-MCNC: 158 MG/DL (ref 0–200)
DEPRECATED RDW RBC AUTO: 42.5 FL (ref 37–54)
ERYTHROCYTE [DISTWIDTH] IN BLOOD BY AUTOMATED COUNT: 16.2 % (ref 12.3–15.4)
ESTRADIOL SERPL HS-MCNC: 29.9 PG/ML
HBA1C MFR BLD: 5.7 % (ref 4.8–5.6)
HCT VFR BLD AUTO: 52.2 % (ref 37.5–51)
HDLC SERPL-MCNC: 47 MG/DL (ref 40–60)
HGB BLD-MCNC: 17.1 G/DL (ref 13–17.7)
LDLC SERPL CALC-MCNC: 96 MG/DL (ref 0–100)
LDLC/HDLC SERPL: 2.04 {RATIO}
MCH RBC QN AUTO: 25.8 PG (ref 26.6–33)
MCHC RBC AUTO-ENTMCNC: 32.8 G/DL (ref 31.5–35.7)
MCV RBC AUTO: 78.7 FL (ref 79–97)
PLATELET # BLD AUTO: 199 10*3/MM3 (ref 140–450)
PMV BLD AUTO: 11.5 FL (ref 6–12)
PSA SERPL-MCNC: 1.06 NG/ML (ref 0–4)
RBC # BLD AUTO: 6.63 10*6/MM3 (ref 4.14–5.8)
TESTOST SERPL-MCNC: 406 NG/DL (ref 249–836)
TRIGL SERPL-MCNC: 76 MG/DL (ref 0–150)
VLDLC SERPL-MCNC: 15 MG/DL (ref 5–40)
WBC NRBC COR # BLD AUTO: 9.85 10*3/MM3 (ref 3.4–10.8)

## 2024-06-26 PROCEDURE — 80061 LIPID PANEL: CPT | Performed by: UROLOGY

## 2024-06-26 PROCEDURE — 82670 ASSAY OF TOTAL ESTRADIOL: CPT | Performed by: UROLOGY

## 2024-06-26 PROCEDURE — 36415 COLL VENOUS BLD VENIPUNCTURE: CPT | Performed by: UROLOGY

## 2024-06-26 PROCEDURE — 85027 COMPLETE CBC AUTOMATED: CPT | Performed by: UROLOGY

## 2024-06-26 PROCEDURE — 83036 HEMOGLOBIN GLYCOSYLATED A1C: CPT | Performed by: UROLOGY

## 2024-06-26 PROCEDURE — 84153 ASSAY OF PSA TOTAL: CPT | Performed by: UROLOGY

## 2024-06-26 PROCEDURE — 84403 ASSAY OF TOTAL TESTOSTERONE: CPT | Performed by: UROLOGY

## 2024-06-28 DIAGNOSIS — E29.1 HYPOGONADISM IN MALE: ICD-10-CM

## 2024-07-01 RX ORDER — TESTOSTERONE CYPIONATE 200 MG/ML
INJECTION, SOLUTION INTRAMUSCULAR
Qty: 8 ML | OUTPATIENT
Start: 2024-07-01

## 2024-07-02 ENCOUNTER — OFFICE VISIT (OUTPATIENT)
Dept: UROLOGY | Facility: CLINIC | Age: 49
End: 2024-07-02
Payer: COMMERCIAL

## 2024-07-02 VITALS
WEIGHT: 211.6 LBS | SYSTOLIC BLOOD PRESSURE: 126 MMHG | HEART RATE: 66 BPM | BODY MASS INDEX: 33.21 KG/M2 | DIASTOLIC BLOOD PRESSURE: 83 MMHG | HEIGHT: 67 IN

## 2024-07-02 DIAGNOSIS — E29.1 HYPOGONADISM IN MALE: ICD-10-CM

## 2024-07-02 DIAGNOSIS — E34.9 TESTOSTERONE DEFICIENCY: Primary | ICD-10-CM

## 2024-07-02 PROCEDURE — 99214 OFFICE O/P EST MOD 30 MIN: CPT | Performed by: UROLOGY

## 2024-07-02 RX ORDER — TESTOSTERONE CYPIONATE 200 MG/ML
INJECTION, SOLUTION INTRAMUSCULAR
Qty: 10 ML | Refills: 2 | Status: SHIPPED | OUTPATIENT
Start: 2024-07-02

## 2024-07-02 NOTE — PROGRESS NOTES
"Chief Complaint:      Chief Complaint   Patient presents with    Testosterone deficiency       HPI:   48 y.o. male patient returns today for follow-up.  He has been on testosterone replacement therapy.  He reports a dramatic improvement in his ANDREI questionnaire: -ANDREI-androgen deficiency in the age male questionnaire. The patient was queried regarding the androgen deficiency in the age male questionnaire.  This is a validated questionnaire that was performed on a set of 314 [place] male physicians. When it was positive it correlated directly with a 94% chance of low testosterone.  Patient indicates there is a decrease in libido or sex drive, a lack of energy, decreased  strength and endurance, a decreased \"enjoyment of life\", sad and grumpy feelings with significant difficulty maintaining erections.  There has also been a recent deterioration regarding work performance. He reports weight loss.  He has good facility and the use of subcutaneous and intramuscular injections as well as comfort level and using the medication in a sterile fashion.  He understands he should use only the prescribed dose.  He is here for appropriate lab monitoring regarding this.  He understands this is a controlled substance and therefore must be watched closely, will not be refilled in the medical loss or miscalculation of the dose.  He is very happy with the treatment and therefore wants to continue it.  Lost a total of 13#    Past Medical History:     Past Medical History:   Diagnosis Date    Fatigue     Hyperlipidemia     Hypertension     Hypogonadism in male     Low testosterone     Seizures     10/24/2019 last seizure       Current Meds:     Current Outpatient Medications   Medication Sig Dispense Refill    Brivaracetam 100 MG tablet Take 1 tablet by mouth 2 (two) times a day.      cetirizine (zyrTEC) 10 MG tablet Take 1 tablet by mouth Daily.      fluticasone (FLONASE) 50 MCG/ACT nasal spray 2 sprays into the nostril(s) as directed " "by provider Daily. 48 g 11    lisinopril (PRINIVIL,ZESTRIL) 10 MG tablet TAKE 1/2 TABLET BY MOUTH DAILY 90 tablet 3    metoprolol tartrate (LOPRESSOR) 25 MG tablet Take 1 tablet by mouth Daily. 90 tablet 3    pravastatin (PRAVACHOL) 40 MG tablet TAKE ONE TABLET BY MOUTH ONCE NIGHTLY 90 tablet 3    Syringe 25G X 5/8\" 3 ML misc Use as directed 2 x weekly 24 each 3    Testosterone Cypionate (Depo-Testosterone) 200 MG/ML injection Inject 1/2 cc subcutaneously every Monday and Thursday 10 mL 2    Testosterone Cypionate 100 MG/ML solution Inject 0.5 mL as directed 2 (Two) Times a Week. 10 mL 2     No current facility-administered medications for this visit.        Allergies:      Allergies   Allergen Reactions    Fish-Derived Products GI Intolerance     Scallops and mackeral    Other Unknown - Low Severity     turnips        Past Surgical History:     Past Surgical History:   Procedure Laterality Date    MOUTH SURGERY      TONSILLECTOMY         Social History:     Social History     Socioeconomic History    Marital status:    Tobacco Use    Smoking status: Former     Current packs/day: 0.00     Types: Cigarettes     Quit date: 2013     Years since quittin.1    Smokeless tobacco: Never   Vaping Use    Vaping status: Never Used   Substance and Sexual Activity    Alcohol use: Never    Drug use: Never    Sexual activity: Yes     Partners: Female     Birth control/protection: Implant       Family History:     Family History   Problem Relation Age of Onset    Prostate cancer Other     Diabetes Mother     Anesthesia problems Mother         Nauseated    Thyroid disease Father     Hyperlipidemia Father     Atrial fibrillation Father     Diabetes Maternal Grandmother     Anemia Paternal Grandmother        Review of Systems:     Review of Systems   Constitutional: Negative.    HENT: Negative.     Eyes: Negative.    Respiratory: Negative.     Cardiovascular: Negative.    Gastrointestinal: Negative.    Endocrine: " Negative.    Musculoskeletal: Negative.    Allergic/Immunologic: Negative.    Neurological: Negative.    Hematological: Negative.    Psychiatric/Behavioral: Negative.         Physical Exam:     Physical Exam  Vitals and nursing note reviewed.   Constitutional:       Appearance: He is well-developed.   HENT:      Head: Normocephalic and atraumatic.   Eyes:      Conjunctiva/sclera: Conjunctivae normal.      Pupils: Pupils are equal, round, and reactive to light.   Cardiovascular:      Rate and Rhythm: Normal rate and regular rhythm.      Heart sounds: Normal heart sounds.   Pulmonary:      Effort: Pulmonary effort is normal.      Breath sounds: Normal breath sounds.   Abdominal:      General: Bowel sounds are normal.      Palpations: Abdomen is soft.   Musculoskeletal:         General: Normal range of motion.      Cervical back: Normal range of motion.   Skin:     General: Skin is warm and dry.   Neurological:      Mental Status: He is alert and oriented to person, place, and time.      Deep Tendon Reflexes: Reflexes are normal and symmetric.   Psychiatric:         Behavior: Behavior normal.         Thought Content: Thought content normal.         Judgment: Judgment normal.         I have reviewed the following portions of the patient's history: Allergies, current medications, past family history, past medical history, past social history, past surgical history, problem list, and ROS and confirm it is accurate.    Recent Image (CT and/or KUB):      CT Abdomen and Pelvis: No results found for this or any previous visit.       CT Stone Protocol: No results found for this or any previous visit.       KUB: No results found for this or any previous visit.       Labs (past 3 months):      Lab on 06/26/2024   Component Date Value Ref Range Status    Testosterone, Total 06/26/2024 406.00  249.00 - 836.00 ng/dL Final    Estradiol 06/26/2024 29.9  pg/mL Final    WBC 06/26/2024 9.85  3.40 - 10.80 10*3/mm3 Final    RBC 06/26/2024  6.63 (H)  4.14 - 5.80 10*6/mm3 Final    Hemoglobin 06/26/2024 17.1  13.0 - 17.7 g/dL Final    Hematocrit 06/26/2024 52.2 (H)  37.5 - 51.0 % Final    MCV 06/26/2024 78.7 (L)  79.0 - 97.0 fL Final    MCH 06/26/2024 25.8 (L)  26.6 - 33.0 pg Final    MCHC 06/26/2024 32.8  31.5 - 35.7 g/dL Final    RDW 06/26/2024 16.2 (H)  12.3 - 15.4 % Final    RDW-SD 06/26/2024 42.5  37.0 - 54.0 fl Final    MPV 06/26/2024 11.5  6.0 - 12.0 fL Final    Platelets 06/26/2024 199  140 - 450 10*3/mm3 Final    PSA 06/26/2024 1.060  0.000 - 4.000 ng/mL Final    Total Cholesterol 06/26/2024 158  0 - 200 mg/dL Final    Triglycerides 06/26/2024 76  0 - 150 mg/dL Final    HDL Cholesterol 06/26/2024 47  40 - 60 mg/dL Final    LDL Cholesterol  06/26/2024 96  0 - 100 mg/dL Final    VLDL Cholesterol 06/26/2024 15  5 - 40 mg/dL Final    LDL/HDL Ratio 06/26/2024 2.04   Final    Hemoglobin A1C 06/26/2024 5.70 (H)  4.80 - 5.60 % Final        Procedure:       Assessment/Plan:   Low testosterone: Patient is here for follow-up.  Since beginning the medication, he has been very pleased.  He reports a dramatic improvement in his erections, ability to achieve and maintain an erection, improvement in libido, increase in frequency of morning erections, and a noticeable weight loss consistent with the treatment.   He is going to have appropriate safety laboratory parameters checked.   He understands that the new data implicates testosterone with the development of prostate cancer and this is all but been disproven and the medical literature as well as the risks of cardiovascular disease which has actually also been disproven.  He understands that while he is a candidate for topical therapy if he is in contact with children this is not an option because it has been shown to accentuate genitalia development at an early age that is frequently irreversible.  He also understands this it is a controlled substance and as such will not be prescribed without appropriate  follow-up and appropriate laboratory investigation.  He understands effects on spermatogenesis including the fact that this is not always completely reversible and not always completely limited his ability to father a child.  He has demonstrated facility in the technique of both intramuscular and subcutaneous injection and has been taught sterility when drawing up the medication.    PSA testing-I am recommending a PSA blood test that stands for prostate specific antigen.  I discussed the pathophysiology of PSA testing indicating its use in the diagnosis and management of prostate cancer.  I discussed the normal range being 0 to 4, but more appropriately being much closer to 0 to 2 in a normal male.  I discussed the fact that after a certain age we don't recommend PSA testing especially in view of numerous comorbidities, that this will not be a useful test.  I discussed many of the things that can artificially raise PSA including a recent infection, urinary tract infection, and recent sexual intercourse, or even the type of movement such as manipulation of the prostate from riding a bicycle.  After all this is taken into account when the test is reviewed, the most important use of PSA is the velocity measurement.  In other words, the change of PSA with time is a very important factor in the use and that we look for greater than 20% rise over a year to help us make the prediction of prostate cancer.  I also discussed that the use with prostate cancer indicating that after a radical prostatectomy, the PSA should be 0 and any rise indicates an early biochemical recurrence.    Hyperestrogenism-we spoke about the role of estrogen metabolism and breakdown in the  presence of testosterone replacement therapy.  We spoke about how high estradiol levels can interfere with the improvement noted in a man on testosterone as well as significant side effects such as pseudogynecomastia.  We discussed the use of the medication  Arimidex used in an off label setting and using a very judicious low-dose fashion to prevent too low of an estradiol which would precipitate bone complications.  Going to check an estradiol level.  He is at higher risk for breast problems due to his replacement    Polycythemia-I am going to check a CBC to rule out hemoglobin changes.  We utilized the American Heart Association guidelines for polycythemia which is a hemoglobin greater than 18 and a hematocrit greater than 54.5.  Recommend therapeutic phlebotomy as the treatment.  It is important that we indicate that is the most likely cause of the polycythemia.  We also discussed the possibility of decreasing the dose of testosterone and of stopping it altogether.    Controlled substance-he understands this is a controlled substance and as such is regulated under the state.  I cannot refill it outside the prescribed window.  I stressed the importance of follow-up and appropriate laboratory parameters monitoring.    Liver function tests-according to the AUA guidelines we will not check liver functions due to the fact this is not an oral alkylated testosterone          This document has been electronically signed by SHARRON FELDMAN MD July 2, 2024 08:20 EDT    Dictated Utilizing Dragon Dictation: Part of this note may be an electronic transcription/translation of spoken language to printed text using the Dragon Dictation System.

## 2024-11-06 ENCOUNTER — TELEPHONE (OUTPATIENT)
Dept: UROLOGY | Facility: CLINIC | Age: 49
End: 2024-11-06
Payer: COMMERCIAL

## 2024-11-06 NOTE — TELEPHONE ENCOUNTER
I called the pt and left him a voicemail letting him know that while trying to process his PA for Testosterone it came back with insurance terminated. I will need new insurance in order to process the PA.

## 2025-01-02 ENCOUNTER — OFFICE VISIT (OUTPATIENT)
Dept: UROLOGY | Facility: CLINIC | Age: 50
End: 2025-01-02
Payer: COMMERCIAL

## 2025-01-02 VITALS
SYSTOLIC BLOOD PRESSURE: 129 MMHG | BODY MASS INDEX: 31.45 KG/M2 | DIASTOLIC BLOOD PRESSURE: 86 MMHG | WEIGHT: 200.4 LBS | HEIGHT: 67 IN

## 2025-01-02 DIAGNOSIS — E34.9 TESTOSTERONE DEFICIENCY: Primary | ICD-10-CM

## 2025-01-02 DIAGNOSIS — E78.00 ELEVATED CHOLESTEROL: ICD-10-CM

## 2025-01-02 DIAGNOSIS — R73.03 PRE-DIABETES: ICD-10-CM

## 2025-01-02 DIAGNOSIS — E29.1 HYPOGONADISM IN MALE: ICD-10-CM

## 2025-01-02 DIAGNOSIS — N42.9 DISORDER OF PROSTATE: ICD-10-CM

## 2025-01-02 LAB
ALBUMIN SERPL-MCNC: 4.5 G/DL (ref 3.5–5.2)
ALBUMIN/GLOB SERPL: 1.7 G/DL
ALP SERPL-CCNC: 66 U/L (ref 39–117)
ALT SERPL W P-5'-P-CCNC: 30 U/L (ref 1–41)
ANION GAP SERPL CALCULATED.3IONS-SCNC: 9.6 MMOL/L (ref 5–15)
AST SERPL-CCNC: 23 U/L (ref 1–40)
BILIRUB SERPL-MCNC: 0.3 MG/DL (ref 0–1.2)
BUN SERPL-MCNC: 20 MG/DL (ref 6–20)
BUN/CREAT SERPL: 16 (ref 7–25)
CALCIUM SPEC-SCNC: 9.7 MG/DL (ref 8.6–10.5)
CHLORIDE SERPL-SCNC: 104 MMOL/L (ref 98–107)
CHOLEST SERPL-MCNC: 242 MG/DL (ref 0–200)
CO2 SERPL-SCNC: 25.4 MMOL/L (ref 22–29)
CREAT SERPL-MCNC: 1.25 MG/DL (ref 0.76–1.27)
DEPRECATED RDW RBC AUTO: 41.4 FL (ref 37–54)
EGFRCR SERPLBLD CKD-EPI 2021: 71 ML/MIN/1.73
ERYTHROCYTE [DISTWIDTH] IN BLOOD BY AUTOMATED COUNT: 13.8 % (ref 12.3–15.4)
ESTRADIOL SERPL HS-MCNC: 34.7 PG/ML
GLOBULIN UR ELPH-MCNC: 2.7 GM/DL
GLUCOSE SERPL-MCNC: 106 MG/DL (ref 65–99)
HCT VFR BLD AUTO: 54.5 % (ref 37.5–51)
HDLC SERPL-MCNC: 46 MG/DL (ref 40–60)
HGB BLD-MCNC: 18.7 G/DL (ref 13–17.7)
LDLC SERPL CALC-MCNC: 153 MG/DL (ref 0–100)
LDLC/HDLC SERPL: 3.25 {RATIO}
MCH RBC QN AUTO: 28.4 PG (ref 26.6–33)
MCHC RBC AUTO-ENTMCNC: 34.3 G/DL (ref 31.5–35.7)
MCV RBC AUTO: 82.8 FL (ref 79–97)
PLATELET # BLD AUTO: 217 10*3/MM3 (ref 140–450)
PMV BLD AUTO: 11.3 FL (ref 6–12)
POTASSIUM SERPL-SCNC: 4.7 MMOL/L (ref 3.5–5.2)
PROT SERPL-MCNC: 7.2 G/DL (ref 6–8.5)
PSA SERPL-MCNC: 1.14 NG/ML (ref 0–4)
RBC # BLD AUTO: 6.58 10*6/MM3 (ref 4.14–5.8)
SODIUM SERPL-SCNC: 139 MMOL/L (ref 136–145)
TESTOST SERPL-MCNC: 554 NG/DL (ref 249–836)
TRIGL SERPL-MCNC: 232 MG/DL (ref 0–150)
VLDLC SERPL-MCNC: 43 MG/DL (ref 5–40)
WBC NRBC COR # BLD AUTO: 8.62 10*3/MM3 (ref 3.4–10.8)

## 2025-01-02 PROCEDURE — 80053 COMPREHEN METABOLIC PANEL: CPT | Performed by: UROLOGY

## 2025-01-02 PROCEDURE — 80061 LIPID PANEL: CPT | Performed by: UROLOGY

## 2025-01-02 PROCEDURE — 82670 ASSAY OF TOTAL ESTRADIOL: CPT | Performed by: UROLOGY

## 2025-01-02 PROCEDURE — 84403 ASSAY OF TOTAL TESTOSTERONE: CPT | Performed by: UROLOGY

## 2025-01-02 PROCEDURE — 84153 ASSAY OF PSA TOTAL: CPT | Performed by: UROLOGY

## 2025-01-02 PROCEDURE — 85027 COMPLETE CBC AUTOMATED: CPT | Performed by: UROLOGY

## 2025-01-02 RX ORDER — TESTOSTERONE CYPIONATE 200 MG/ML
INJECTION, SOLUTION INTRAMUSCULAR
Qty: 10 ML | Refills: 2 | Status: SHIPPED | OUTPATIENT
Start: 2025-01-02

## 2025-01-02 NOTE — PROGRESS NOTES
"Chief Complaint:      Chief Complaint   Patient presents with    low testosterone      6 month follow up        HPI:   49 y.o. male patient returns today for follow-up.  He has been on testosterone replacement therapy.  He reports a dramatic improvement in his ANDREI questionnaire: -ANDREI-androgen deficiency in the age male questionnaire. The patient was queried regarding the androgen deficiency in the age male questionnaire.  This is a validated questionnaire that was performed on a set of 314 [place] male physicians. When it was positive it correlated directly with a 94% chance of low testosterone.  Patient indicates there is a decrease in libido or sex drive, a lack of energy, decreased  strength and endurance, a decreased \"enjoyment of life\", sad and grumpy feelings with significant difficulty maintaining erections.  There has also been a recent deterioration regarding work performance. He reports weight loss.  He has good facility and the use of subcutaneous and intramuscular injections as well as comfort level and using the medication in a sterile fashion.  He understands he should use only the prescribed dose.  He is here for appropriate lab monitoring regarding this.  He understands this is a controlled substance and therefore must be watched closely, will not be refilled in the medical loss or miscalculation of the dose.  He is very happy with the treatment and therefore wants to continue it.    Past Medical History:     Past Medical History:   Diagnosis Date    Fatigue     Hyperlipidemia     Hypertension     Hypogonadism in male     Low testosterone     Seizures     10/24/2019 last seizure       Current Meds:     Current Outpatient Medications   Medication Sig Dispense Refill    Brivaracetam 100 MG tablet Take 1 tablet by mouth 2 (two) times a day.      cetirizine (zyrTEC) 10 MG tablet Take 1 tablet by mouth Daily.      fluticasone (FLONASE) 50 MCG/ACT nasal spray 2 sprays into the nostril(s) as directed by " "provider Daily. 48 g 11    lisinopril (PRINIVIL,ZESTRIL) 10 MG tablet TAKE 1/2 TABLET BY MOUTH DAILY 90 tablet 3    metoprolol tartrate (LOPRESSOR) 25 MG tablet Take 1 tablet by mouth Daily. 90 tablet 3    pravastatin (PRAVACHOL) 40 MG tablet TAKE ONE TABLET BY MOUTH ONCE NIGHTLY 90 tablet 3    Syringe 25G X 5/8\" 3 ML misc Use as directed 2 x weekly 24 each 3    Testosterone Cypionate (Depo-Testosterone) 200 MG/ML injection Inject 1/2 cc subcutaneously every Monday and Thursday 10 mL 2    Testosterone Cypionate 100 MG/ML solution Inject 0.5 mL as directed 2 (Two) Times a Week. 10 mL 2     No current facility-administered medications for this visit.        Allergies:      Allergies   Allergen Reactions    Fish-Derived Products GI Intolerance     Scallops and mackeral    Other Unknown - Low Severity     turnips        Past Surgical History:     Past Surgical History:   Procedure Laterality Date    MOUTH SURGERY      TONSILLECTOMY         Social History:     Social History     Socioeconomic History    Marital status:    Tobacco Use    Smoking status: Former     Current packs/day: 0.00     Types: Cigarettes     Quit date: 2013     Years since quittin.6    Smokeless tobacco: Never   Vaping Use    Vaping status: Never Used   Substance and Sexual Activity    Alcohol use: Never    Drug use: Never    Sexual activity: Yes     Partners: Female     Birth control/protection: Implant       Family History:     Family History   Problem Relation Age of Onset    Prostate cancer Other     Diabetes Mother     Anesthesia problems Mother         Nauseated    Thyroid disease Father     Hyperlipidemia Father     Atrial fibrillation Father     Diabetes Maternal Grandmother     Anemia Paternal Grandmother        Review of Systems:     Review of Systems   Constitutional: Negative.    HENT: Negative.     Eyes: Negative.    Respiratory: Negative.     Cardiovascular: Negative.    Gastrointestinal: Negative.    Endocrine: " Negative.    Musculoskeletal: Negative.    Allergic/Immunologic: Negative.    Neurological: Negative.    Hematological: Negative.    Psychiatric/Behavioral: Negative.         Physical Exam:     Physical Exam  Vitals and nursing note reviewed.   Constitutional:       Appearance: He is well-developed.   HENT:      Head: Normocephalic and atraumatic.   Eyes:      Conjunctiva/sclera: Conjunctivae normal.      Pupils: Pupils are equal, round, and reactive to light.   Cardiovascular:      Rate and Rhythm: Normal rate and regular rhythm.      Heart sounds: Normal heart sounds.   Pulmonary:      Effort: Pulmonary effort is normal.      Breath sounds: Normal breath sounds.   Abdominal:      General: Bowel sounds are normal.      Palpations: Abdomen is soft.   Musculoskeletal:         General: Normal range of motion.      Cervical back: Normal range of motion.   Skin:     General: Skin is warm and dry.   Neurological:      Mental Status: He is alert and oriented to person, place, and time.      Deep Tendon Reflexes: Reflexes are normal and symmetric.   Psychiatric:         Behavior: Behavior normal.         Thought Content: Thought content normal.         Judgment: Judgment normal.         I have reviewed the following portions of the patient's history: Allergies, current medications, past family history, past medical history, past social history, past surgical history, problem list, and ROS and confirm it is accurate.    Recent Image (CT and/or KUB):      CT Abdomen and Pelvis: No results found for this or any previous visit.       CT Stone Protocol: No results found for this or any previous visit.       KUB: No results found for this or any previous visit.       Labs (past 3 months):      No visits with results within 3 Month(s) from this visit.   Latest known visit with results is:   Lab on 06/26/2024   Component Date Value Ref Range Status    Testosterone, Total 06/26/2024 406.00  249.00 - 836.00 ng/dL Final    Estradiol  06/26/2024 29.9  pg/mL Final    WBC 06/26/2024 9.85  3.40 - 10.80 10*3/mm3 Final    RBC 06/26/2024 6.63 (H)  4.14 - 5.80 10*6/mm3 Final    Hemoglobin 06/26/2024 17.1  13.0 - 17.7 g/dL Final    Hematocrit 06/26/2024 52.2 (H)  37.5 - 51.0 % Final    MCV 06/26/2024 78.7 (L)  79.0 - 97.0 fL Final    MCH 06/26/2024 25.8 (L)  26.6 - 33.0 pg Final    MCHC 06/26/2024 32.8  31.5 - 35.7 g/dL Final    RDW 06/26/2024 16.2 (H)  12.3 - 15.4 % Final    RDW-SD 06/26/2024 42.5  37.0 - 54.0 fl Final    MPV 06/26/2024 11.5  6.0 - 12.0 fL Final    Platelets 06/26/2024 199  140 - 450 10*3/mm3 Final    PSA 06/26/2024 1.060  0.000 - 4.000 ng/mL Final    Total Cholesterol 06/26/2024 158  0 - 200 mg/dL Final    Triglycerides 06/26/2024 76  0 - 150 mg/dL Final    HDL Cholesterol 06/26/2024 47  40 - 60 mg/dL Final    LDL Cholesterol  06/26/2024 96  0 - 100 mg/dL Final    VLDL Cholesterol 06/26/2024 15  5 - 40 mg/dL Final    LDL/HDL Ratio 06/26/2024 2.04   Final    Hemoglobin A1C 06/26/2024 5.70 (H)  4.80 - 5.60 % Final        Procedure:       Assessment/Plan:   Low testosterone: Patient is here for follow-up.  Since beginning the medication, he has been very pleased.  He reports a dramatic improvement in his erections, ability to achieve and maintain an erection, improvement in libido, increase in frequency of morning erections, and a noticeable weight loss consistent with the treatment.   He is going to have appropriate safety laboratory parameters checked.   He understands that the new data implicates testosterone with the development of prostate cancer and this is all but been disproven and the medical literature as well as the risks of cardiovascular disease which has actually also been disproven.  He understands that while he is a candidate for topical therapy if he is in contact with children this is not an option because it has been shown to accentuate genitalia development at an early age that is frequently irreversible.  He also  understands this it is a controlled substance and as such will not be prescribed without appropriate follow-up and appropriate laboratory investigation.  He understands effects on spermatogenesis including the fact that this is not always completely reversible and not always completely limited his ability to father a child.  He has demonstrated facility in the technique of both intramuscular and subcutaneous injection and has been taught sterility when drawing up the medication.    Erectile dysfunction-we discussed the anatomy and physiology of the penis and the endothelium.  We discussed the various forms of erectile dysfunction including peripheral vascular occlusive disease, postoperative, secondary to radiation treatments of the prostate, and arterial inflow.  We discussed the various treatment options available including oral medication and its various forms.  We discussed the use of both generic and non-generic Viagra.  We discussed Cialis and a longer half-life of 17 hours as well as the other 2 medications.  We discussed cost involved with this including the fact that the generic is much cheaper but is taken as multiple pills because they are 20 mg dosages.  We did discuss the other alternatives including penile injections, vacuum erection devices, and surgical intervention reserved for only the most severe cases.  We discussed the need for testosterone in about 20% of cases of erectile dysfunction.  Continue PDE-5 inhibition    PSA testing-I am recommending a PSA blood test that stands for prostate specific antigen.  I discussed the pathophysiology of PSA testing indicating its use in the diagnosis and management of prostate cancer.  I discussed the normal range being 0 to 4, but more appropriately being much closer to 0 to 2 in a normal male.  I discussed the fact that after a certain age we don't recommend PSA testing especially in view of numerous comorbidities, that this will not be a useful test.  I  discussed many of the things that can artificially raise PSA including a recent infection, urinary tract infection, and recent sexual intercourse, or even the type of movement such as manipulation of the prostate from riding a bicycle.  After all this is taken into account when the test is reviewed, the most important use of PSA is the velocity measurement.  In other words, the change of PSA with time is a very important factor in the use and that we look for greater than 20% rise over a year to help us make the prediction of prostate cancer.  I also discussed that the use with prostate cancer indicating that after a radical prostatectomy, the PSA should be 0 and any rise indicates an early biochemical recurrence.    Hyperestrogenism-we spoke about the role of estrogen metabolism and breakdown in the  presence of testosterone replacement therapy.  We spoke about how high estradiol levels can interfere with the improvement noted in a man on testosterone as well as significant side effects such as pseudogynecomastia.  We discussed the use of the medication Arimidex used in an off label setting and using a very judicious low-dose fashion to prevent too low of an estradiol which would precipitate bone complications.  Going to check an estradiol level.  He is at higher risk for breast problems due to his replacement    Polycythemia-I am going to check a CBC to rule out hemoglobin changes.  We utilized the American Heart Association guidelines for polycythemia which is a hemoglobin greater than 18 and a hematocrit greater than 54.5.  Recommend therapeutic phlebotomy as the treatment.  It is important that we indicate that is the most likely cause of the polycythemia.  We also discussed the possibility of decreasing the dose of testosterone and of stopping it altogether.    Controlled substance-he understands this is a controlled substance and as such is regulated under the state.  I cannot refill it outside the prescribed  window.  I stressed the importance of follow-up and appropriate laboratory parameters monitoring.    Liver function tests-according to the AUA guidelines we will not check liver functions due to the fact this is not an oral alkylated testosterone        This document has been electronically signed by SHARRON FELDMAN MD January 2, 2025 08:15 EST    Dictated Utilizing Dragon Dictation: Part of this note may be an electronic transcription/translation of spoken language to printed text using the Dragon Dictation System.

## 2025-01-14 ENCOUNTER — TELEPHONE (OUTPATIENT)
Dept: GASTROENTEROLOGY | Facility: CLINIC | Age: 50
End: 2025-01-14

## 2025-01-15 ENCOUNTER — TELEPHONE (OUTPATIENT)
Dept: UROLOGY | Facility: CLINIC | Age: 50
End: 2025-01-15
Payer: COMMERCIAL

## 2025-01-15 NOTE — TELEPHONE ENCOUNTER
PA for Testosterone has been sent to pt's insurance company and waiting for a response back.             Sent to Plan today  Drug  Depo-Testosterone 200MG/ML solution  ePA cloud logo  Form  [facility] Electronic PA Form (2017 NCPDP)   45

## 2025-02-21 DIAGNOSIS — E34.9 TESTOSTERONE DEFICIENCY: Primary | ICD-10-CM

## 2025-02-24 DIAGNOSIS — D75.1 POLYCYTHEMIA, SECONDARY: Primary | ICD-10-CM

## 2025-02-26 ENCOUNTER — LAB (OUTPATIENT)
Dept: ONCOLOGY | Facility: HOSPITAL | Age: 50
End: 2025-02-26
Payer: COMMERCIAL

## 2025-02-26 VITALS
HEART RATE: 73 BPM | OXYGEN SATURATION: 96 % | RESPIRATION RATE: 16 BRPM | TEMPERATURE: 98.3 F | DIASTOLIC BLOOD PRESSURE: 83 MMHG | SYSTOLIC BLOOD PRESSURE: 145 MMHG

## 2025-02-26 DIAGNOSIS — D75.1 POLYCYTHEMIA, SECONDARY: ICD-10-CM

## 2025-02-26 DIAGNOSIS — E34.9 TESTOSTERONE DEFICIENCY: ICD-10-CM

## 2025-02-26 LAB
BASOPHILS # BLD AUTO: 0.04 10*3/MM3 (ref 0–0.2)
BASOPHILS NFR BLD AUTO: 0.6 % (ref 0–1.5)
DEPRECATED RDW RBC AUTO: 43.1 FL (ref 37–54)
EOSINOPHIL # BLD AUTO: 0.15 10*3/MM3 (ref 0–0.4)
EOSINOPHIL NFR BLD AUTO: 2.1 % (ref 0.3–6.2)
ERYTHROCYTE [DISTWIDTH] IN BLOOD BY AUTOMATED COUNT: 13.8 % (ref 12.3–15.4)
HCT VFR BLD AUTO: 53.2 % (ref 37.5–51)
HGB BLD-MCNC: 17.2 G/DL (ref 13–17.7)
IMM GRANULOCYTES # BLD AUTO: 0.01 10*3/MM3 (ref 0–0.05)
IMM GRANULOCYTES NFR BLD AUTO: 0.1 % (ref 0–0.5)
LYMPHOCYTES # BLD AUTO: 2.17 10*3/MM3 (ref 0.7–3.1)
LYMPHOCYTES NFR BLD AUTO: 29.8 % (ref 19.6–45.3)
Lab: NORMAL
MCH RBC QN AUTO: 28.1 PG (ref 26.6–33)
MCHC RBC AUTO-ENTMCNC: 32.3 G/DL (ref 31.5–35.7)
MCV RBC AUTO: 86.9 FL (ref 79–97)
MONOCYTES # BLD AUTO: 0.56 10*3/MM3 (ref 0.1–0.9)
MONOCYTES NFR BLD AUTO: 7.7 % (ref 5–12)
NEUTROPHILS NFR BLD AUTO: 4.34 10*3/MM3 (ref 1.7–7)
NEUTROPHILS NFR BLD AUTO: 59.7 % (ref 42.7–76)
NRBC BLD AUTO-RTO: 0 /100 WBC (ref 0–0.2)
PLATELET # BLD AUTO: 184 10*3/MM3 (ref 140–450)
PMV BLD AUTO: 9.9 FL (ref 6–12)
POST-BLOOD PRESSURE: NORMAL MMHG
PRE-BLOOD PRESSURE: NORMAL MMHG
PRE-HCT: 53.2 %
PRE-HGB: 17.2 G/DL
PRE-PULSE: 73 BPM
RBC # BLD AUTO: 6.12 10*6/MM3 (ref 4.14–5.8)
VOLUME COLLECTED: 503 ML
WBC NRBC COR # BLD AUTO: 7.27 10*3/MM3 (ref 3.4–10.8)

## 2025-02-26 PROCEDURE — 99195 PHLEBOTOMY: CPT

## 2025-02-26 PROCEDURE — 85025 COMPLETE CBC W/AUTO DIFF WBC: CPT

## 2025-02-26 NOTE — PROGRESS NOTES
Pre-Phlebotomy  Vitals:    02/26/25 0819   BP: 145/83   Pulse: 73   Resp: 16   Temp: 98.3 °F (36.8 °C)   SpO2: 96%       Phlebotomy performed and 503 mL removed by phlebotomist    Post phlebotomy @ 0838  BP: 124/78  HR: 79    Patient denies any nausea, dizziness or lightheadedness. Patient ambulatory from department.

## 2025-03-25 ENCOUNTER — LAB (OUTPATIENT)
Dept: ONCOLOGY | Facility: HOSPITAL | Age: 50
End: 2025-03-25
Payer: COMMERCIAL

## 2025-03-25 VITALS
TEMPERATURE: 98.1 F | SYSTOLIC BLOOD PRESSURE: 144 MMHG | HEART RATE: 68 BPM | OXYGEN SATURATION: 98 % | DIASTOLIC BLOOD PRESSURE: 80 MMHG | RESPIRATION RATE: 18 BRPM

## 2025-03-25 DIAGNOSIS — D75.1 POLYCYTHEMIA, SECONDARY: ICD-10-CM

## 2025-03-25 DIAGNOSIS — E34.9 TESTOSTERONE DEFICIENCY: ICD-10-CM

## 2025-03-25 LAB
BASOPHILS # BLD AUTO: 0.06 10*3/MM3 (ref 0–0.2)
BASOPHILS NFR BLD AUTO: 0.7 % (ref 0–1.5)
DEPRECATED RDW RBC AUTO: 41.8 FL (ref 37–54)
EOSINOPHIL # BLD AUTO: 0.15 10*3/MM3 (ref 0–0.4)
EOSINOPHIL NFR BLD AUTO: 1.9 % (ref 0.3–6.2)
ERYTHROCYTE [DISTWIDTH] IN BLOOD BY AUTOMATED COUNT: 12.9 % (ref 12.3–15.4)
HCT VFR BLD AUTO: 54 % (ref 37.5–51)
HGB BLD-MCNC: 17.5 G/DL (ref 13–17.7)
IMM GRANULOCYTES # BLD AUTO: 0.02 10*3/MM3 (ref 0–0.05)
IMM GRANULOCYTES NFR BLD AUTO: 0.2 % (ref 0–0.5)
LYMPHOCYTES # BLD AUTO: 2.2 10*3/MM3 (ref 0.7–3.1)
LYMPHOCYTES NFR BLD AUTO: 27.2 % (ref 19.6–45.3)
Lab: NORMAL
MCH RBC QN AUTO: 28.5 PG (ref 26.6–33)
MCHC RBC AUTO-ENTMCNC: 32.4 G/DL (ref 31.5–35.7)
MCV RBC AUTO: 87.9 FL (ref 79–97)
MONOCYTES # BLD AUTO: 0.62 10*3/MM3 (ref 0.1–0.9)
MONOCYTES NFR BLD AUTO: 7.7 % (ref 5–12)
NEUTROPHILS NFR BLD AUTO: 5.03 10*3/MM3 (ref 1.7–7)
NEUTROPHILS NFR BLD AUTO: 62.3 % (ref 42.7–76)
NRBC BLD AUTO-RTO: 0 /100 WBC (ref 0–0.2)
PLATELET # BLD AUTO: 199 10*3/MM3 (ref 140–450)
PMV BLD AUTO: 9.9 FL (ref 6–12)
POST-BLOOD PRESSURE: NORMAL MMHG
PRE-BLOOD PRESSURE: NORMAL MMHG
PRE-HCT: 54 %
PRE-HGB: 17.5 G/DL
PRE-PULSE: 68 BPM
RBC # BLD AUTO: 6.14 10*6/MM3 (ref 4.14–5.8)
VOLUME COLLECTED: 500 ML
WBC NRBC COR # BLD AUTO: 8.08 10*3/MM3 (ref 3.4–10.8)

## 2025-03-25 PROCEDURE — 99195 PHLEBOTOMY: CPT

## 2025-03-25 PROCEDURE — 85025 COMPLETE CBC W/AUTO DIFF WBC: CPT

## 2025-03-25 NOTE — PROGRESS NOTES
Pre-Phlebotomy  Vitals:    03/25/25 0815   BP: 144/80   Pulse: 68   Resp: 18   Temp: 98.1 °F (36.7 °C)   SpO2: 98%       Phlebotomy performed and 500 mL removed by phlebotomist    Post phlebotomy @ 0840  BP: 130/81  HR: 68    Patient denies any nausea, dizziness or lightheadedness. Patient ambulatory from department.

## 2025-04-22 ENCOUNTER — LAB (OUTPATIENT)
Dept: ONCOLOGY | Facility: HOSPITAL | Age: 50
End: 2025-04-22
Payer: COMMERCIAL

## 2025-04-22 VITALS
OXYGEN SATURATION: 96 % | HEART RATE: 82 BPM | DIASTOLIC BLOOD PRESSURE: 78 MMHG | TEMPERATURE: 98.1 F | SYSTOLIC BLOOD PRESSURE: 143 MMHG | RESPIRATION RATE: 18 BRPM

## 2025-04-22 DIAGNOSIS — D75.1 POLYCYTHEMIA, SECONDARY: ICD-10-CM

## 2025-04-22 LAB
BASOPHILS # BLD AUTO: 0.07 10*3/MM3 (ref 0–0.2)
BASOPHILS NFR BLD AUTO: 0.8 % (ref 0–1.5)
DEPRECATED RDW RBC AUTO: 39.4 FL (ref 37–54)
EOSINOPHIL # BLD AUTO: 0.19 10*3/MM3 (ref 0–0.4)
EOSINOPHIL NFR BLD AUTO: 2.2 % (ref 0.3–6.2)
ERYTHROCYTE [DISTWIDTH] IN BLOOD BY AUTOMATED COUNT: 12.3 % (ref 12.3–15.4)
HCT VFR BLD AUTO: 52.6 % (ref 37.5–51)
HGB BLD-MCNC: 16.7 G/DL (ref 13–17.7)
IMM GRANULOCYTES # BLD AUTO: 0.03 10*3/MM3 (ref 0–0.05)
IMM GRANULOCYTES NFR BLD AUTO: 0.4 % (ref 0–0.5)
LYMPHOCYTES # BLD AUTO: 2.17 10*3/MM3 (ref 0.7–3.1)
LYMPHOCYTES NFR BLD AUTO: 25.3 % (ref 19.6–45.3)
MCH RBC QN AUTO: 27.6 PG (ref 26.6–33)
MCHC RBC AUTO-ENTMCNC: 31.7 G/DL (ref 31.5–35.7)
MCV RBC AUTO: 86.9 FL (ref 79–97)
MONOCYTES # BLD AUTO: 0.65 10*3/MM3 (ref 0.1–0.9)
MONOCYTES NFR BLD AUTO: 7.6 % (ref 5–12)
NEUTROPHILS NFR BLD AUTO: 5.46 10*3/MM3 (ref 1.7–7)
NEUTROPHILS NFR BLD AUTO: 63.7 % (ref 42.7–76)
NRBC BLD AUTO-RTO: 0 /100 WBC (ref 0–0.2)
PLATELET # BLD AUTO: 218 10*3/MM3 (ref 140–450)
PMV BLD AUTO: 10.5 FL (ref 6–12)
RBC # BLD AUTO: 6.05 10*6/MM3 (ref 4.14–5.8)
WBC NRBC COR # BLD AUTO: 8.57 10*3/MM3 (ref 3.4–10.8)

## 2025-04-22 PROCEDURE — 85025 COMPLETE CBC W/AUTO DIFF WBC: CPT

## 2025-04-22 PROCEDURE — G0463 HOSPITAL OUTPT CLINIC VISIT: HCPCS

## 2025-04-22 NOTE — PROGRESS NOTES
The patient arrived at the clinic for therapeutic phlebotomy. Vital signs and laboratory test were obtained as ordered. The lab was reviewed, and the patient did not meet the criteria for phlebotomy. A return appointment was scheduled.

## 2025-05-21 ENCOUNTER — HOSPITAL ENCOUNTER (OUTPATIENT)
Dept: INFUSION THERAPY | Facility: HOSPITAL | Age: 50
Discharge: HOME | End: 2025-05-21
Admitting: UROLOGY
Payer: COMMERCIAL

## 2025-05-21 VITALS
DIASTOLIC BLOOD PRESSURE: 88 MMHG | OXYGEN SATURATION: 96 % | TEMPERATURE: 97.4 F | RESPIRATION RATE: 18 BRPM | HEART RATE: 78 BPM | SYSTOLIC BLOOD PRESSURE: 157 MMHG

## 2025-05-21 DIAGNOSIS — D75.1 POLYCYTHEMIA, SECONDARY: ICD-10-CM

## 2025-05-21 LAB
BASOPHILS # BLD AUTO: 0.07 10*3/MM3 (ref 0–0.2)
BASOPHILS NFR BLD AUTO: 0.8 % (ref 0–1.5)
DEPRECATED RDW RBC AUTO: 39.3 FL (ref 37–54)
EOSINOPHIL # BLD AUTO: 0.21 10*3/MM3 (ref 0–0.4)
EOSINOPHIL NFR BLD AUTO: 2.5 % (ref 0.3–6.2)
ERYTHROCYTE [DISTWIDTH] IN BLOOD BY AUTOMATED COUNT: 12.7 % (ref 12.3–15.4)
HCT VFR BLD AUTO: 52.7 % (ref 37.5–51)
HGB BLD-MCNC: 16.6 G/DL (ref 13–17.7)
IMM GRANULOCYTES # BLD AUTO: 0.02 10*3/MM3 (ref 0–0.05)
IMM GRANULOCYTES NFR BLD AUTO: 0.2 % (ref 0–0.5)
LYMPHOCYTES # BLD AUTO: 2.45 10*3/MM3 (ref 0.7–3.1)
LYMPHOCYTES NFR BLD AUTO: 29.1 % (ref 19.6–45.3)
MCH RBC QN AUTO: 26.9 PG (ref 26.6–33)
MCHC RBC AUTO-ENTMCNC: 31.5 G/DL (ref 31.5–35.7)
MCV RBC AUTO: 85.6 FL (ref 79–97)
MONOCYTES # BLD AUTO: 0.8 10*3/MM3 (ref 0.1–0.9)
MONOCYTES NFR BLD AUTO: 9.5 % (ref 5–12)
NEUTROPHILS NFR BLD AUTO: 4.86 10*3/MM3 (ref 1.7–7)
NEUTROPHILS NFR BLD AUTO: 57.9 % (ref 42.7–76)
NRBC BLD AUTO-RTO: 0 /100 WBC (ref 0–0.2)
PLATELET # BLD AUTO: 198 10*3/MM3 (ref 140–450)
PMV BLD AUTO: 10.8 FL (ref 6–12)
RBC # BLD AUTO: 6.16 10*6/MM3 (ref 4.14–5.8)
WBC NRBC COR # BLD AUTO: 8.41 10*3/MM3 (ref 3.4–10.8)

## 2025-05-21 PROCEDURE — 36415 COLL VENOUS BLD VENIPUNCTURE: CPT

## 2025-05-21 PROCEDURE — G0463 HOSPITAL OUTPT CLINIC VISIT: HCPCS

## 2025-05-21 PROCEDURE — 85025 COMPLETE CBC W/AUTO DIFF WBC: CPT | Performed by: UROLOGY

## 2025-05-21 NOTE — CODE DOCUMENTATION
The patient arrived at the clinic for therapeutic phlebotomy. Vital signs and laboratory test were obtained as ordered. The lab was reviewed; hemoglobin level was 16.6, and the patient did not meet the criteria for phlebotomy. A return appointment was scheduled.

## 2025-06-04 ENCOUNTER — TELEPHONE (OUTPATIENT)
Dept: UROLOGY | Facility: CLINIC | Age: 50
End: 2025-06-04

## 2025-06-04 ENCOUNTER — TELEPHONE (OUTPATIENT)
Dept: UROLOGY | Facility: CLINIC | Age: 50
End: 2025-06-04
Payer: COMMERCIAL

## 2025-06-04 DIAGNOSIS — E29.1 HYPOGONADISM IN MALE: Primary | ICD-10-CM

## 2025-06-04 DIAGNOSIS — N42.9 DISORDER OF PROSTATE: ICD-10-CM

## 2025-06-04 NOTE — TELEPHONE ENCOUNTER
Provider: DR FELDMAN    Caller: Bentley Monaco    Relationship to Patient: Self      Reason for Call: PT CALLED TO CONFIRM THAT [facility] W[facility] [place]    PH# [phone]    PLEASE CALL TO CONFIRM ONCE ORDERS HAVE BEEN SENT TO  [facility].

## 2025-06-04 NOTE — TELEPHONE ENCOUNTER
I called the pt and let him know I entered the orders and will happy to fax them. He did not have the fax number and said he would call back with it.

## 2025-06-04 NOTE — TELEPHONE ENCOUNTER
Patient is being seen for Low T in a couple of weeks. He is wanting to have his lab orders faxed to [facility] in [place] because his wife has insurance thru them and its cheaper to have it done there please.

## 2025-06-05 NOTE — TELEPHONE ENCOUNTER
[facility] and got their fax number and faxed over the lab orders with confirmation

## 2025-06-18 ENCOUNTER — HOSPITAL ENCOUNTER (OUTPATIENT)
Dept: INFUSION THERAPY | Facility: HOSPITAL | Age: 50
Discharge: HOME | End: 2025-06-18
Admitting: UROLOGY
Payer: COMMERCIAL

## 2025-06-18 VITALS
OXYGEN SATURATION: 95 % | RESPIRATION RATE: 18 BRPM | DIASTOLIC BLOOD PRESSURE: 75 MMHG | TEMPERATURE: 97.1 F | HEART RATE: 74 BPM | SYSTOLIC BLOOD PRESSURE: 135 MMHG

## 2025-06-18 DIAGNOSIS — D75.1 POLYCYTHEMIA, SECONDARY: ICD-10-CM

## 2025-06-18 DIAGNOSIS — E34.9 TESTOSTERONE DEFICIENCY: ICD-10-CM

## 2025-06-18 LAB
BASOPHILS # BLD AUTO: 0.07 10*3/MM3 (ref 0–0.2)
BASOPHILS NFR BLD AUTO: 0.8 % (ref 0–1.5)
DEPRECATED RDW RBC AUTO: 40.1 FL (ref 37–54)
EOSINOPHIL # BLD AUTO: 0.23 10*3/MM3 (ref 0–0.4)
EOSINOPHIL NFR BLD AUTO: 2.7 % (ref 0.3–6.2)
ERYTHROCYTE [DISTWIDTH] IN BLOOD BY AUTOMATED COUNT: 14.1 % (ref 12.3–15.4)
HCT VFR BLD AUTO: 55.5 % (ref 37.5–51)
HGB BLD-MCNC: 17.3 G/DL (ref 13–17.7)
IMM GRANULOCYTES # BLD AUTO: 0.02 10*3/MM3 (ref 0–0.05)
IMM GRANULOCYTES NFR BLD AUTO: 0.2 % (ref 0–0.5)
LYMPHOCYTES # BLD AUTO: 2.28 10*3/MM3 (ref 0.7–3.1)
LYMPHOCYTES NFR BLD AUTO: 27.2 % (ref 19.6–45.3)
Lab: NORMAL
MCH RBC QN AUTO: 25.6 PG (ref 26.6–33)
MCHC RBC AUTO-ENTMCNC: 31.2 G/DL (ref 31.5–35.7)
MCV RBC AUTO: 82 FL (ref 79–97)
MONOCYTES # BLD AUTO: 0.77 10*3/MM3 (ref 0.1–0.9)
MONOCYTES NFR BLD AUTO: 9.2 % (ref 5–12)
NEUTROPHILS NFR BLD AUTO: 5.01 10*3/MM3 (ref 1.7–7)
NEUTROPHILS NFR BLD AUTO: 59.9 % (ref 42.7–76)
NRBC BLD AUTO-RTO: 0 /100 WBC (ref 0–0.2)
PLATELET # BLD AUTO: 231 10*3/MM3 (ref 140–450)
PMV BLD AUTO: 10.2 FL (ref 6–12)
POST-BLOOD PRESSURE: NORMAL MMHG
PRE-BLOOD PRESSURE: NORMAL MMHG
PRE-HCT: 55.5 %
PRE-HGB: 17.3 G/DL
PRE-PULSE: 69 BPM
RBC # BLD AUTO: 6.77 10*6/MM3 (ref 4.14–5.8)
VOLUME COLLECTED: 506 ML
WBC NRBC COR # BLD AUTO: 8.38 10*3/MM3 (ref 3.4–10.8)

## 2025-06-18 PROCEDURE — 99195 PHLEBOTOMY: CPT

## 2025-06-18 PROCEDURE — 36415 COLL VENOUS BLD VENIPUNCTURE: CPT

## 2025-06-18 PROCEDURE — 85025 COMPLETE CBC W/AUTO DIFF WBC: CPT | Performed by: UROLOGY

## 2025-06-18 PROCEDURE — 99195 PHLEBOTOMY: CPT | Performed by: UROLOGY

## 2025-06-18 NOTE — CODE DOCUMENTATION
Pre-Phlebotomy  Vitals:    06/18/25 0810   BP: 129/74   Pulse: 69   Resp: 18   Temp: 97.1 °F (36.2 °C)   SpO2: 95%     HgB: 17.3 HCT: 55.5    Do you have bleeding tendencies:  NO  Do you have chest pain or shortness of breath? NO  Have you ever had heart disease? NO  If yes, describe:  Are any of the above symptoms new or has your medical history changed since your last phlebotomy? NO    Electronic reading of calibrated 500 gm weight:    Time Started: 0905  Time Stopped: 0910  Volume of blood removed (in mL):  506 ML    Post phlebotomy @  BP: 135/75  HR: 74     Patient denies any nausea, dizziness or lightheadedness. Patient ambulatory from department.

## 2025-06-23 ENCOUNTER — OFFICE VISIT (OUTPATIENT)
Dept: UROLOGY | Facility: CLINIC | Age: 50
End: 2025-06-23
Payer: COMMERCIAL

## 2025-06-23 DIAGNOSIS — E29.1 HYPOGONADISM IN MALE: ICD-10-CM

## 2025-06-23 DIAGNOSIS — E34.9 TESTOSTERONE DEFICIENCY: Primary | ICD-10-CM

## 2025-06-23 PROCEDURE — 99213 OFFICE O/P EST LOW 20 MIN: CPT

## 2025-06-23 NOTE — PROGRESS NOTES
"Chief Complaint:    Chief Complaint   Patient presents with    Low testosterone      6 month       Vital Signs:   There were no vitals taken for this visit.  There is no height or weight on file to calculate BMI.      HPI:  Bentley Monaco is a 49 y.o. male who presents today for follow up    History of Present Illness  Mr. Monaco returns to the clinic today for follow-up for low testosterone.  Due to patient's insurance cost he does have his labs completed in outside facility and had recent lab work completed at the around the middle of June that showed a testosterone of 455, estradiol of 26, PSA of 2.4, and H&H of 16.9 and 55.1 respectively.  He has no other complaints and is doing well.  Did advise him given elevated hematocrit would recommend to donate blood q. every 3 months.      Past Medical History:  Past Medical History:   Diagnosis Date    Fatigue     Hyperlipidemia     Hypertension     Hypogonadism in male     Low testosterone     Seizures     10/24/2019 last seizure       Current Meds:  Current Outpatient Medications   Medication Sig Dispense Refill    Brivaracetam 100 MG tablet Take 1 tablet by mouth 2 (two) times a day.      cetirizine (zyrTEC) 10 MG tablet Take 1 tablet by mouth Daily.      fluticasone (FLONASE) 50 MCG/ACT nasal spray 2 sprays into the nostril(s) as directed by provider Daily. 48 g 11    lisinopril (PRINIVIL,ZESTRIL) 10 MG tablet TAKE 1/2 TABLET BY MOUTH DAILY 90 tablet 3    metoprolol tartrate (LOPRESSOR) 25 MG tablet Take 1 tablet by mouth Daily. 90 tablet 3    pravastatin (PRAVACHOL) 40 MG tablet TAKE ONE TABLET BY MOUTH ONCE NIGHTLY 90 tablet 3    Syringe 25G X 5/8\" 3 ML misc Use as directed 2 x weekly 24 each 3    Testosterone Cypionate (Depo-Testosterone) 200 MG/ML injection Inject 1/2 cc subcutaneously every Monday and Thursday 10 mL 2    Testosterone Cypionate 100 MG/ML solution Inject 0.5 mL as directed 2 (Two) Times a Week. 10 mL 2     No current facility-administered medications " for this visit.        Allergies:   Allergies   Allergen Reactions    Fish-Derived Products GI Intolerance     Scallops and mackeral    Other Unknown - Low Severity     turnips        Past Surgical History:  Past Surgical History:   Procedure Laterality Date    MOUTH SURGERY      TONSILLECTOMY         Social History:  Social History     Socioeconomic History    Marital status:    Tobacco Use    Smoking status: Former     Current packs/day: 0.00     Types: Cigarettes     Quit date: 2013     Years since quittin.1    Smokeless tobacco: Never   Vaping Use    Vaping status: Never Used   Substance and Sexual Activity    Alcohol use: Never    Drug use: Never    Sexual activity: Yes     Partners: Female     Birth control/protection: Implant       Family History:  Family History   Problem Relation Age of Onset    Prostate cancer Other     Diabetes Mother     Anesthesia problems Mother         Nauseated    Thyroid disease Father     Hyperlipidemia Father     Atrial fibrillation Father     Diabetes Maternal Grandmother     Anemia Paternal Grandmother        Review of Systems:  Review of Systems   Constitutional:  Negative for fatigue, fever and unexpected weight change.   Respiratory:  Negative for chest tightness and shortness of breath.    Cardiovascular:  Negative for chest pain.   Gastrointestinal:  Negative for abdominal pain, constipation, diarrhea, nausea and vomiting.   Genitourinary:  Negative for difficulty urinating, dysuria, frequency and urgency.   Skin:  Negative for rash.   Psychiatric/Behavioral:  Negative for confusion and suicidal ideas.        Physical Exam:  Physical Exam  Constitutional:       General: He is not in acute distress.     Appearance: Normal appearance.   HENT:      Head: Normocephalic and atraumatic.      Nose: Nose normal.      Mouth/Throat:      Mouth: Mucous membranes are moist.   Eyes:      Conjunctiva/sclera: Conjunctivae normal.   Cardiovascular:      Rate and Rhythm:  Normal rate.      Pulses: Normal pulses.   Pulmonary:      Effort: Pulmonary effort is normal.   Abdominal:      Palpations: Abdomen is soft.   Musculoskeletal:         General: Normal range of motion.      Cervical back: Normal range of motion.   Skin:     General: Skin is warm.   Neurological:      General: No focal deficit present.      Mental Status: He is alert and oriented to person, place, and time.   Psychiatric:         Mood and Affect: Mood normal.         Behavior: Behavior normal.         Thought Content: Thought content normal.         Judgment: Judgment normal.           Recent Image (CT and/or KUB):   CT Abdomen and Pelvis: No results found for this or any previous visit.     CT Stone Protocol: No results found for this or any previous visit.     KUB: No results found for this or any previous visit.       Labs:  Brief Urine Lab Results       None          Hospital Outpatient Visit on 06/18/2025   Component Date Value Ref Range Status    WBC 06/18/2025 8.38  3.40 - 10.80 10*3/mm3 Final    RBC 06/18/2025 6.77 (H)  4.14 - 5.80 10*6/mm3 Final    Hemoglobin 06/18/2025 17.3  13.0 - 17.7 g/dL Final    Hematocrit 06/18/2025 55.5 (H)  37.5 - 51.0 % Final    MCV 06/18/2025 82.0  79.0 - 97.0 fL Final    MCH 06/18/2025 25.6 (L)  26.6 - 33.0 pg Final    MCHC 06/18/2025 31.2 (L)  31.5 - 35.7 g/dL Final    RDW 06/18/2025 14.1  12.3 - 15.4 % Final    RDW-SD 06/18/2025 40.1  37.0 - 54.0 fl Final    MPV 06/18/2025 10.2  6.0 - 12.0 fL Final    Platelets 06/18/2025 231  140 - 450 10*3/mm3 Final    Neutrophil % 06/18/2025 59.9  42.7 - 76.0 % Final    Lymphocyte % 06/18/2025 27.2  19.6 - 45.3 % Final    Monocyte % 06/18/2025 9.2  5.0 - 12.0 % Final    Eosinophil % 06/18/2025 2.7  0.3 - 6.2 % Final    Basophil % 06/18/2025 0.8  0.0 - 1.5 % Final    Immature Grans % 06/18/2025 0.2  0.0 - 0.5 % Final    Neutrophils, Absolute 06/18/2025 5.01  1.70 - 7.00 10*3/mm3 Final    Lymphocytes, Absolute 06/18/2025 2.28  0.70 - 3.10  10*3/mm3 Final    Monocytes, Absolute 06/18/2025 0.77  0.10 - 0.90 10*3/mm3 Final    Eosinophils, Absolute 06/18/2025 0.23  0.00 - 0.40 10*3/mm3 Final    Basophils, Absolute 06/18/2025 0.07  0.00 - 0.20 10*3/mm3 Final    Immature Grans, Absolute 06/18/2025 0.02  0.00 - 0.05 10*3/mm3 Final    nRBC 06/18/2025 0.0  0.0 - 0.2 /100 WBC Final    Pulse 06/18/2025 69  BPM Final    Volume Collected 06/18/2025 506  mL Final    Pre-Hgb 06/18/2025 17.3  g/dL Final    Pre-Hct 06/18/2025 55.5  % Final    Pre-Blood Pressure 06/18/2025 129/74  mmHg Final    Post-Blood Pressure 06/18/2025 135/75  mmHg Final    Lot Number 06/18/2025 [id number]   Final   Hospital Outpatient Visit on 05/21/2025   Component Date Value Ref Range Status    WBC 05/21/2025 8.41  3.40 - 10.80 10*3/mm3 Final    RBC 05/21/2025 6.16 (H)  4.14 - 5.80 10*6/mm3 Final    Hemoglobin 05/21/2025 16.6  13.0 - 17.7 g/dL Final    Hematocrit 05/21/2025 52.7 (H)  37.5 - 51.0 % Final    MCV 05/21/2025 85.6  79.0 - 97.0 fL Final    MCH 05/21/2025 26.9  26.6 - 33.0 pg Final    MCHC 05/21/2025 31.5  31.5 - 35.7 g/dL Final    RDW 05/21/2025 12.7  12.3 - 15.4 % Final    RDW-SD 05/21/2025 39.3  37.0 - 54.0 fl Final    MPV 05/21/2025 10.8  6.0 - 12.0 fL Final    Platelets 05/21/2025 198  140 - 450 10*3/mm3 Final    Neutrophil % 05/21/2025 57.9  42.7 - 76.0 % Final    Lymphocyte % 05/21/2025 29.1  19.6 - 45.3 % Final    Monocyte % 05/21/2025 9.5  5.0 - 12.0 % Final    Eosinophil % 05/21/2025 2.5  0.3 - 6.2 % Final    Basophil % 05/21/2025 0.8  0.0 - 1.5 % Final    Immature Grans % 05/21/2025 0.2  0.0 - 0.5 % Final    Neutrophils, Absolute 05/21/2025 4.86  1.70 - 7.00 10*3/mm3 Final    Lymphocytes, Absolute 05/21/2025 2.45  0.70 - 3.10 10*3/mm3 Final    Monocytes, Absolute 05/21/2025 0.80  0.10 - 0.90 10*3/mm3 Final    Eosinophils, Absolute 05/21/2025 0.21  0.00 - 0.40 10*3/mm3 Final    Basophils, Absolute 05/21/2025 0.07  0.00 - 0.20 10*3/mm3 Final    Immature Grans, Absolute  05/21/2025 0.02  0.00 - 0.05 10*3/mm3 Final    nRBC 05/21/2025 0.0  0.0 - 0.2 /100 WBC Final   Lab on 04/22/2025   Component Date Value Ref Range Status    WBC 04/22/2025 8.57  3.40 - 10.80 10*3/mm3 Final    RBC 04/22/2025 6.05 (H)  4.14 - 5.80 10*6/mm3 Final    Hemoglobin 04/22/2025 16.7  13.0 - 17.7 g/dL Final    Hematocrit 04/22/2025 52.6 (H)  37.5 - 51.0 % Final    MCV 04/22/2025 86.9  79.0 - 97.0 fL Final    MCH 04/22/2025 27.6  26.6 - 33.0 pg Final    MCHC 04/22/2025 31.7  31.5 - 35.7 g/dL Final    RDW 04/22/2025 12.3  12.3 - 15.4 % Final    RDW-SD 04/22/2025 39.4  37.0 - 54.0 fl Final    MPV 04/22/2025 10.5  6.0 - 12.0 fL Final    Platelets 04/22/2025 218  140 - 450 10*3/mm3 Final    Neutrophil % 04/22/2025 63.7  42.7 - 76.0 % Final    Lymphocyte % 04/22/2025 25.3  19.6 - 45.3 % Final    Monocyte % 04/22/2025 7.6  5.0 - 12.0 % Final    Eosinophil % 04/22/2025 2.2  0.3 - 6.2 % Final    Basophil % 04/22/2025 0.8  0.0 - 1.5 % Final    Immature Grans % 04/22/2025 0.4  0.0 - 0.5 % Final    Neutrophils, Absolute 04/22/2025 5.46  1.70 - 7.00 10*3/mm3 Final    Lymphocytes, Absolute 04/22/2025 2.17  0.70 - 3.10 10*3/mm3 Final    Monocytes, Absolute 04/22/2025 0.65  0.10 - 0.90 10*3/mm3 Final    Eosinophils, Absolute 04/22/2025 0.19  0.00 - 0.40 10*3/mm3 Final    Basophils, Absolute 04/22/2025 0.07  0.00 - 0.20 10*3/mm3 Final    Immature Grans, Absolute 04/22/2025 0.03  0.00 - 0.05 10*3/mm3 Final    nRBC 04/22/2025 0.0  0.0 - 0.2 /100 WBC Final   Lab on 03/25/2025   Component Date Value Ref Range Status    Pulse 03/25/2025 68  BPM Final    Volume Collected 03/25/2025 500  mL Final    Pre-Hgb 03/25/2025 17.5  g/dL Final    Pre-Hct 03/25/2025 54  % Final    Pre-Blood Pressure 03/25/2025 144/80  mmHg Final    Post-Blood Pressure 03/25/2025 130/81  mmHg Final    Lot Number 03/25/2025 [id number]   Final    WBC 03/25/2025 8.08  3.40 - 10.80 10*3/mm3 Final    RBC 03/25/2025 6.14 (H)  4.14 - 5.80 10*6/mm3 Final    Hemoglobin  03/25/2025 17.5  13.0 - 17.7 g/dL Final    Hematocrit 03/25/2025 54.0 (H)  37.5 - 51.0 % Final    MCV 03/25/2025 87.9  79.0 - 97.0 fL Final    MCH 03/25/2025 28.5  26.6 - 33.0 pg Final    MCHC 03/25/2025 32.4  31.5 - 35.7 g/dL Final    RDW 03/25/2025 12.9  12.3 - 15.4 % Final    RDW-SD 03/25/2025 41.8  37.0 - 54.0 fl Final    MPV 03/25/2025 9.9  6.0 - 12.0 fL Final    Platelets 03/25/2025 199  140 - 450 10*3/mm3 Final    Neutrophil % 03/25/2025 62.3  42.7 - 76.0 % Final    Lymphocyte % 03/25/2025 27.2  19.6 - 45.3 % Final    Monocyte % 03/25/2025 7.7  5.0 - 12.0 % Final    Eosinophil % 03/25/2025 1.9  0.3 - 6.2 % Final    Basophil % 03/25/2025 0.7  0.0 - 1.5 % Final    Immature Grans % 03/25/2025 0.2  0.0 - 0.5 % Final    Neutrophils, Absolute 03/25/2025 5.03  1.70 - 7.00 10*3/mm3 Final    Lymphocytes, Absolute 03/25/2025 2.20  0.70 - 3.10 10*3/mm3 Final    Monocytes, Absolute 03/25/2025 0.62  0.10 - 0.90 10*3/mm3 Final    Eosinophils, Absolute 03/25/2025 0.15  0.00 - 0.40 10*3/mm3 Final    Basophils, Absolute 03/25/2025 0.06  0.00 - 0.20 10*3/mm3 Final    Immature Grans, Absolute 03/25/2025 0.02  0.00 - 0.05 10*3/mm3 Final    nRBC 03/25/2025 0.0  0.0 - 0.2 /100 WBC Final        Procedure: None  Procedures     I have reviewed and agree with the above PMH, PSH, FMH, social history, medications, allergies, and labs.     Assessment/Plan:   Problem List Items Addressed This Visit       Testosterone deficiency - Primary    Hypogonadism in male       Health Maintenance:   Health Maintenance Due   Topic Date Due    HEPATITIS C SCREENING  Never done    ANNUAL PHYSICAL  Never done    COVID-19 Vaccine (5 - 2024-25 season) 09/01/2024    TDAP/TD VACCINES (2 - Td or Tdap) 01/01/2025        Smoking Counseling: Former smoker.  Never used smokeless tobacco.    Urine Incontinence: Patient reports that he is not currently experiencing any symptoms of urinary incontinence.    Patient was given instructions and counseling regarding his  condition or for health maintenance advice. Please see specific information pulled into the AVS if appropriate.    Patient Education:   Low testosterone: Patient is here for follow-up.  Since beginning the medication, he has been very pleased.  He reports a dramatic improvement in his erections, ability to achieve and maintain an erection, improvement in libido, increase in frequency of morning erections, and a noticeable weight loss consistent with the treatment.   He is going to have appropriate safety laboratory parameters checked.   He understands that the new data implicates testosterone with the development of prostate cancer and this is all but been disproven and the medical literature as well as the risks of cardiovascular disease which has actually also been disproven.  He understands that while he is a candidate for topical therapy if he is in contact with children this is not an option because it has been shown to accentuate genitalia development at an early age that is frequently irreversible.  He also understands this it is a controlled substance and as such will not be prescribed without appropriate follow-up and appropriate laboratory investigation.  He understands effects on spermatogenesis including the fact that this is not always completely reversible and not always completely limited his ability to father a child.  He has demonstrated facility in the technique of both intramuscular and subcutaneous injection and has been taught sterility when drawing up the medication.     Hyperestrogenism-we spoke about the role of estrogen metabolism and breakdown in the  presence of testosterone replacement therapy.  We spoke about how high estradiol levels can interfere with the improvement noted in a man on testosterone as well as significant side effects such as pseudogynecomastia.  We discussed the use of the medication Arimidex used in an off label setting and using a very judicious low-dose fashion to  prevent too low of an estradiol which would precipitate bone complications.  Going to check an estradiol level.  He is at higher risk for breast problems due to his replacement.  Patient's estradiol level was within normal range and would recommend observation.     Polycythemia-I am going to check a CBC to rule out hemoglobin changes.  We utilized the American Heart Association guidelines for polycythemia which is a hemoglobin greater than 18 and a hematocrit greater than 54.5.  Recommend therapeutic phlebotomy as the treatment.  It is important that we indicate that is the most likely cause of the polycythemia.  We also discussed the possibility of decreasing the dose of testosterone and of stopping it altogether.  Given patient's elevated H&H I did recommend to donate blood q. every 3 months.  He verbalized understanding.     Controlled substance-he understands this is a controlled substance and as such is regulated under the state.  I cannot refill it outside the prescribed window.  I stressed the importance of follow-up and appropriate laboratory parameters monitoring.     Liver function tests-according to the AUA guidelines we will not check liver functions due to the fact this is not an oral alkylated testosterone      Visit Diagnoses:    ICD-10-CM ICD-9-CM   1. Testosterone deficiency  E34.9 259.9   2. Hypogonadism in male  E29.1 257.2     A total of 20 minutes were spent coordinating this patient’s care in clinic today; 12 minutes of which were face-to-face with the patient, reviewing medical history and counseling on the current treatment and followup plan.  All questions were answered to patient's satisfaction.    Meds Ordered During Visit:  No orders of the defined types were placed in this encounter.      Follow Up Appointment: As needed  No follow-ups on file.      This document has been electronically signed by Owen Cevallos PA-C   June 24, 2025 07:34 EDT    Part of this note may be an electronic  transcription/translation of spoken language to printed text using the Dragon Dictation System.

## 2025-06-26 ENCOUNTER — TELEPHONE (OUTPATIENT)
Dept: UROLOGY | Facility: CLINIC | Age: 50
End: 2025-06-26